# Patient Record
Sex: MALE | Race: WHITE
[De-identification: names, ages, dates, MRNs, and addresses within clinical notes are randomized per-mention and may not be internally consistent; named-entity substitution may affect disease eponyms.]

---

## 2018-07-30 ENCOUNTER — HOSPITAL ENCOUNTER (INPATIENT)
Dept: HOSPITAL 45 - C.EDB | Age: 73
LOS: 3 days | Discharge: HOME | DRG: 392 | End: 2018-08-02
Attending: HOSPITALIST | Admitting: HOSPITALIST
Payer: OTHER GOVERNMENT

## 2018-07-30 VITALS
SYSTOLIC BLOOD PRESSURE: 137 MMHG | TEMPERATURE: 99.14 F | OXYGEN SATURATION: 95 % | DIASTOLIC BLOOD PRESSURE: 69 MMHG | HEART RATE: 101 BPM

## 2018-07-30 VITALS
BODY MASS INDEX: 17.81 KG/M2 | BODY MASS INDEX: 17.81 KG/M2 | BODY MASS INDEX: 17.81 KG/M2 | HEIGHT: 71 IN | WEIGHT: 127.21 LBS | WEIGHT: 127.21 LBS | HEIGHT: 71 IN

## 2018-07-30 VITALS
TEMPERATURE: 97.52 F | DIASTOLIC BLOOD PRESSURE: 65 MMHG | OXYGEN SATURATION: 94 % | HEART RATE: 90 BPM | SYSTOLIC BLOOD PRESSURE: 164 MMHG

## 2018-07-30 VITALS
DIASTOLIC BLOOD PRESSURE: 83 MMHG | TEMPERATURE: 97.7 F | HEART RATE: 97 BPM | OXYGEN SATURATION: 99 % | SYSTOLIC BLOOD PRESSURE: 174 MMHG

## 2018-07-30 DIAGNOSIS — M16.11: ICD-10-CM

## 2018-07-30 DIAGNOSIS — Z87.891: ICD-10-CM

## 2018-07-30 DIAGNOSIS — E22.2: ICD-10-CM

## 2018-07-30 DIAGNOSIS — I10: ICD-10-CM

## 2018-07-30 DIAGNOSIS — F10.10: ICD-10-CM

## 2018-07-30 DIAGNOSIS — R63.4: ICD-10-CM

## 2018-07-30 DIAGNOSIS — K22.2: Primary | ICD-10-CM

## 2018-07-30 DIAGNOSIS — F17.200: ICD-10-CM

## 2018-07-30 DIAGNOSIS — T18.108A: ICD-10-CM

## 2018-07-30 LAB
ALBUMIN SERPL-MCNC: 3.6 GM/DL (ref 3.4–5)
ALP SERPL-CCNC: 102 U/L (ref 45–117)
ALT SERPL-CCNC: 38 U/L (ref 12–78)
AST SERPL-CCNC: 41 U/L (ref 15–37)
BASOPHILS # BLD: 0.02 K/UL (ref 0–0.2)
BASOPHILS NFR BLD: 0.2 %
BUN SERPL-MCNC: 9 MG/DL (ref 7–18)
CALCIUM SERPL-MCNC: 8.5 MG/DL (ref 8.5–10.1)
CALCIUM SERPL-MCNC: 8.6 MG/DL (ref 8.5–10.1)
CALCIUM SERPL-MCNC: 9.6 MG/DL (ref 8.5–10.1)
CO2 SERPL-SCNC: 26 MMOL/L (ref 21–32)
CO2 SERPL-SCNC: 28 MMOL/L (ref 21–32)
CO2 SERPL-SCNC: 29 MMOL/L (ref 21–32)
CREAT SERPL-MCNC: 0.62 MG/DL (ref 0.6–1.4)
CREAT SERPL-MCNC: 0.67 MG/DL (ref 0.6–1.4)
CREAT SERPL-MCNC: 0.67 MG/DL (ref 0.6–1.4)
EOS ABS #: 0.01 K/UL (ref 0–0.5)
EOSINOPHIL NFR BLD AUTO: 243 K/UL (ref 130–400)
GLUCOSE SERPL-MCNC: 157 MG/DL (ref 70–99)
GLUCOSE SERPL-MCNC: 89 MG/DL (ref 70–99)
GLUCOSE SERPL-MCNC: 97 MG/DL (ref 70–99)
HCT VFR BLD CALC: 39.4 % (ref 42–52)
HGB BLD-MCNC: 14.4 G/DL (ref 14–18)
IG#: 0.1 K/UL (ref 0–0.02)
IMM GRANULOCYTES NFR BLD AUTO: 5.2 %
INR PPP: 1 (ref 0.9–1.1)
LIPASE: 109 U/L (ref 73–393)
LYMPHOCYTES # BLD: 0.65 K/UL (ref 1.2–3.4)
MCH RBC QN AUTO: 33.6 PG (ref 25–34)
MCHC RBC AUTO-ENTMCNC: 36.5 G/DL (ref 32–36)
MCV RBC AUTO: 92.1 FL (ref 80–100)
MONO ABS #: 1.4 K/UL (ref 0.11–0.59)
MONOCYTES NFR BLD: 11.2 %
NEUT ABS #: 10.27 K/UL (ref 1.4–6.5)
NEUTROPHILS # BLD AUTO: 0.1 %
NEUTROPHILS NFR BLD AUTO: 82.5 %
PMV BLD AUTO: 8.4 FL (ref 7.4–10.4)
POTASSIUM SERPL-SCNC: 3.5 MMOL/L (ref 3.5–5.1)
POTASSIUM SERPL-SCNC: 4.3 MMOL/L (ref 3.5–5.1)
POTASSIUM SERPL-SCNC: 4.5 MMOL/L (ref 3.5–5.1)
PROT SERPL-MCNC: 7.6 GM/DL (ref 6.4–8.2)
RED CELL DISTRIBUTION WIDTH CV: 12.3 % (ref 11.5–14.5)
RED CELL DISTRIBUTION WIDTH SD: 41.6 FL (ref 36.4–46.3)
SODIUM RANDOM URINE: 26 MEQ/L
SODIUM SERPL-SCNC: 124 MMOL/L (ref 136–145)
SODIUM SERPL-SCNC: 125 MMOL/L (ref 136–145)
SODIUM SERPL-SCNC: 126 MMOL/L (ref 136–145)
T4 FREE SERPL-MCNC: 427 MOMS/KG (ref 500–800)
WBC # BLD AUTO: 12.45 K/UL (ref 4.8–10.8)

## 2018-07-30 RX ADMIN — GABAPENTIN SCH MG: 600 TABLET, FILM COATED ORAL at 21:09

## 2018-07-30 RX ADMIN — SODIUM CHLORIDE SCH MLS/HR: 900 INJECTION, SOLUTION INTRAVENOUS at 16:45

## 2018-07-30 RX ADMIN — ENOXAPARIN SODIUM SCH MG: 40 INJECTION SUBCUTANEOUS at 21:12

## 2018-07-30 RX ADMIN — Medication SCH TAB: at 17:16

## 2018-07-30 NOTE — EMERGENCY ROOM VISIT NOTE
History


Report prepared by Vicente:  Pauly Braun


Under the Supervision of:  Dr. Wyatt Tinajero D.O.


First contact with patient:  12:57


Chief Complaint:  SORETHROAT


Stated Complaint:  CANNOT SWALLOW





History of Present Illness


The patient is a 72 year old male who presents to the Emergency Room with 

complaints of a constant sore throat since last night. The patient notes that 

he was eating roast beef when it started to feel like something was lodged in 

his throat. He states that he tried to make himself vomit, but it did not 

relieve the feeling. The patient notes that he can swallow his secretions, but 

can not swallow water, other drinks, or food since last night. He states that 

when he does try to eat or drink, he vomits. The patient states that the same 

issue happened three years ago, where he was also eating roast beef. He notes 

that he had a surgery to remove the food. The patient also complains of middle 

back pain, which does not change in severity with twisting or turning. He 

denies chest pain, shortness of breath, weakness, and numbness.  Family also 

notes that he has been very weak recently.  He also admits to drinking about 6-

10 beers per night.





   Source of History:  patient


   Onset:  Last Night


   Position:  throat


   Quality:  other (sore)


   Timing:  constant


   Modifying Factors (Worsening):  eating, drinking


   Associated Symptoms:  + back pain, No chest pain, No SOB, No weakness, No 

numbness





Review of Systems


See HPI for pertinent positives & negatives. A total of 10 systems reviewed and 

were otherwise negative.





Past Medical & Surgical


Medical Problems:


(1) Arthritis


(2) Hypertension








Family History





Patient reports no known family medical history.





Social History


Smoking Status:  Never Smoker


Alcohol Use:  occasionally


Marital Status:  


Housing Status:  lives with significant other


Occupation Status:  employed





Current/Historical Medications


Scheduled


Lisinopril (Prinivil), 5 MG PO DAILY





Allergies


Coded Allergies:  


     No Known Allergies (Unverified , 7/30/18)





Physical Exam


Vital Signs











  Date Time  Temp Pulse Resp B/P (MAP) Pulse Ox O2 Delivery O2 Flow Rate FiO2


 


7/30/18 15:27  89 16 162/88 97 Room Air  


 


7/30/18 14:15  81 18 168/78 97 Room Air  


 


7/30/18 12:50 36.5 66 18 170/87 95 Room Air  











Physical Exam


GENERAL: Cachectic, malnourished, sitting up in bed, disheveled


EYE EXAM: normal conjunctiva. 


OROPHARYNX: no exudate, no erythema, lips, buccal mucosa, and tongue normal and 

mucous membranes are moist


NECK: supple, no nuchal rigidity, no adenopathy, non-tender


LUNGS: Clear to auscultation. Normal chest wall mechanics


HEART: no murmurs, S1 normal and S2 normal 


ABDOMEN: abdomen soft, non-tender, normo-active bowel sounds, no masses, no 

rebound or guarding. 


BACK: Back is symmetrical on inspection and there is no deformity, no midline 

tenderness, no CVA tenderness. 


SKIN: no rashes and no bruising 


UPPER EXTREMITIES: upper extremities are grossly normal. 


LOWER EXTREMITIES: No pitting edema. Calves are equal bilaterally.


NEURO EXAM: Normal sensorium, cranial nerves II-XII grossly intact, normal 

speech,  no gross weakness of arms, no gross weakness of legs.





Medical Decision & Procedures


ER Provider


Diagnostic Interpretation:


Radiology results as stated below per my review and the radiologist's 

interpretation: 





CHEST ONE VIEW PORTABLE





CLINICAL HISTORY: food stuck  dysphagia





COMPARISON STUDY:  6/16/2016





FINDINGS: Chronic emphysematous and basilar interstitial change. Chronic apical


pleural thickening primarily on the right.





No new or interval finding. No focal infiltrate. 





IMPRESSION:  Chronic emphysematous and interstitial change. No acute process. 

















The above report was generated using voice recognition software.  It may contain


grammatical, syntax or spelling errors.














Electronically signed by:  Salvador Johnson M.D.


7/30/2018 1:32 PM





Dictated Date/Time:  7/30/2018 1:31 PM





Laboratory Results











Test


  7/30/18


13:15 7/30/18


13:51


 


Magnesium Level


  2.0 mg/dl


(1.8-2.4) 


 


 


Total Bilirubin


  0.8 mg/dl


(0.2-1) 


 


 


Direct Bilirubin


  0.3 mg/dl


(0-0.2) 


 


 


Aspartate Amino Transf


(AST/SGOT) 41 U/L (15-37) 


  


 


 


Alanine Aminotransferase


(ALT/SGPT) 38 U/L (12-78) 


  


 


 


Alkaline Phosphatase


  102 U/L


() 


 


 


Total Protein


  7.6 gm/dl


(6.4-8.2) 


 


 


Albumin


  3.6 gm/dl


(3.4-5.0) 


 


 


Lipase


  109 U/L


() 


 


 


Immature Granulocyte % (Auto)  0.8 % 


 


White Blood Count


  


  12.45 K/uL


(4.8-10.8)


 


Red Blood Count


  


  4.28 M/uL


(4.7-6.1)


 


Hemoglobin


  


  14.4 g/dL


(14.0-18.0)


 


Hematocrit  39.4 % (42-52) 


 


Mean Corpuscular Volume


  


  92.1 fL


()


 


Mean Corpuscular Hemoglobin


  


  33.6 pg


(25-34)


 


Mean Corpuscular Hemoglobin


Concent 


  36.5 g/dl


(32-36)


 


Platelet Count


  


  243 K/uL


(130-400)


 


Mean Platelet Volume


  


  8.4 fL


(7.4-10.4)


 


Neutrophils (%) (Auto)  82.5 % 


 


Lymphocytes (%) (Auto)  5.2 % 


 


Monocytes (%) (Auto)  11.2 % 


 


Eosinophils (%) (Auto)  0.1 % 


 


Basophils (%) (Auto)  0.2 % 


 


Neutrophils # (Auto)


  


  10.27 K/uL


(1.4-6.5)


 


Lymphocytes # (Auto)


  


  0.65 K/uL


(1.2-3.4)


 


Monocytes # (Auto)


  


  1.40 K/uL


(0.11-0.59)


 


Eosinophils # (Auto)


  


  0.01 K/uL


(0-0.5)


 


Basophils # (Auto)


  


  0.02 K/uL


(0-0.2)


 


Immature Granulocyte # (Auto)


  


  0.10 K/uL


(0.00-0.02)





Laboratory results per my review.





Medications Administered











 Medications


  (Trade)  Dose


 Ordered  Sig/Jai


 Route  Start Time


 Stop Time Status Last Admin


Dose Admin


 


 Glucagon


  (Glucagon Inj)  1 mg  NOW  STAT


 IV  7/30/18 13:10


 7/30/18 13:12 DC 7/30/18 13:49


1 MG


 


 Ondansetron HCl


  (Zofran Inj)  4 mg  NOW  STAT


 IV  7/30/18 13:10


 7/30/18 13:12 DC 7/30/18 13:48


4 MG


 


 Sodium Chloride  500 ml @ 


 999 mls/hr  Q31M STAT


 IV  7/30/18 13:10


 7/30/18 13:40 DC 7/30/18 13:48


999 MLS/HR


 


 Sodium Chloride  500 ml @ 


 999 mls/hr  Q31M STAT


 IV  7/30/18 15:13


 7/30/18 15:43 DC 7/30/18 15:28


999 MLS/HR











ECG Per My Interpretation


Indication:  back/shoulder pain


Rate (beats per minute):  81


Rhythm:  sinus rhythm


Findings:  other (normal axis, no PVCs)





ED Course


ED COURSE: 


Vital signs were reviewed and showed situational hypertension.


The patients medical record was reviewed


The above diagnostic studies were performed and reviewed.


ED treatments and interventions as stated above. 





1302: The patient was evaluated in room A2. A complete history and physical 

examination was performed.





1310: Ordered  ml @ 999 mls/hr IV, Zofran Inj 4 mg IV, Glucagon 1 mg IV.





1447: I reevaluated the patient at this time. He was able to tolerate a glass 

of water.





1507: Upon reevaluation, the patient is resting comfortably. I discussed my 

findings with the patient and he understands and agrees with the treatment 

plan.   


Based on the patients age, coexisting illnesses, exam and lab findings the 

decision to treat as an inpatient was made.


The patient remained stable while under my care.


The patient will be evaluated for further management.





1510: I discussed the patient's case with Anette Spangler [facility] 

Hospitalist. She will evaluate the patient for further management.





1513: Ordered  ml @ 999 mls/hr IV.





Medical Decision


Differential Diagnosis includes but is not limited to dehydration, stroke, 

anemia, hypoglycemia, hyponatremia, hypernatremia, urinary tract infection, 

pneumonia, bronchitis, sepsis, gastroenteritis, additional abdominal pathology, 

metabolic abnormalities and infections.   





Patient is a 9-year-old male who presents the ER for inability to swallow.  He 

notes that last night he was eating beef and he felt something is stuck.  Since 

then he is only been able to tolerate secretions.  Onset this he notes has been 

feeling very weak.  He was given IV along with glucagon.  He had complete 

resolution of his symptoms of the food bolus.  He has had that before in the 

past.  CBC and BMP shows sodium of 124.  Bilirubin, LFTs and lipase was normal.

  Chest x-ray was unremarkable.  Patient/family were updated at bedside.  

Patient was given fluids.  Patient was admitted to internal medicine for 

further workup.





Medication Reconcilliation


Current Medication List:  was personally reviewed by me





Blood Pressure Screening


Patient's blood pressure:  Elevated blood pressure


Blood pressure disposition:  Elevated BP felt to be situational





Consults


Time Called:  1505


Consulting Physician:  Karla Schreckengost, Pa-C- [facility]


Returned Call:  1510


I discussed the patient's case with Karla Schreckengost, Pa-C- [facility] 

Hospitalist. She will evaluate the patient for further management.





Impression





 Primary Impression:  


 Esophageal foreign body


 Additional Impressions:  


 Hyponatremia


 Weak





Scribe Attestation


The scribe's documentation has been prepared under my direction and personally 

reviewed by me in its entirety. I confirm that the note above accurately 

reflects all work, treatment, procedures, and medical decision making performed 

by me.





Departure Information


Dispostion


Being Evaluated By Hospitalist





Referrals


Suyapa Shell C.R.N.P. (PCP)





Patient Instructions


[facility]





Problem Qualifiers








 Primary Impression:  


 Esophageal foreign body


 Encounter type:  initial encounter  Qualified Codes:  T18.108A - Unspecified 

foreign body in esophagus causing other injury, initial encounter

## 2018-07-30 NOTE — DIAGNOSTIC IMAGING REPORT
ABDOMEN AND PELVIS CT WITH IV AND ORAL CONTRAST



CT DOSE: 256.09 mGy.cm



HISTORY:      unintentional weight loss



TECHNIQUE: Multiaxial CT images of the abdomen and pelvis were performed

following the use of intravenous and oral contrast.  A dose lowering technique

was utilized adhering to the principles of ALARA.



COMPARISON STUDY: None.



FINDINGS: Emphysema at the lung bases. Scattered small patchy groundglass

densities within the lower lobes, right greater than left. This favors a mild

pneumonitis. No pneumoperitoneum. No pneumatosis. Severe degenerative changes

within the right hip. Bilateral L5 spondylolysis. There is a right hip effusion.

There is hepatic steatosis. No hepatic or splenic masses. The adrenal glands,

pancreas, gallbladder, and kidneys are unremarkable. No hydronephrosis. There is

a left retroaortic renal vein. A few calcified periportal lymph nodes. Calcified

plaque within the normal caliber abdominal aorta. Mild bladder wall thickening.

Colonic diverticulosis. No bowel wall thickening or obstruction. Normal

appendix. Mild motion artifact.



IMPRESSION: 



1. No bowel wall thickening or obstruction.

2. Normal appendix.

3. Colonic diverticulosis.

4. Hepatic steatosis.

5. Mild bladder wall thickening. This may be chronic. Recommend correlation with

urinalysis.

6. Severe right hip osteoarthritis with associated hip effusion.

7. Small patchy groundglass densities within the bilateral lower lobes. This may

represent a low-grade pneumonia.







Electronically signed by:  Cr Sherman M.D.

7/30/2018 7:25 PM



Dictated Date/Time:  7/30/2018 7:16 PM

## 2018-07-30 NOTE — HISTORY AND PHYSICAL
History & Physical


Date & Time of Service:


Jul 30, 2018 ~ 15:30


Chief Complaint:


Cannot Swallow


Primary Care Physician:


[facility]


History of Present Illness


Source:  patient


72-year-old male who presents the ED with reports of difficulty swallowing.  

Patient reports he had roast beef for dinner last night.  He reports that he 

had taken a bite and some got stuck in his throat.  He reports he was able to 

take small sips of water however anything larger he would have to spit up.  He 

reports he was able to swallow his own secretions.  He tried to self induce 

vomiting however that did not resolve his symptoms.  Symptoms persisted through 

to today and patient presented to the ER for further evaluation.  He denies 

chest pain and shortness of breath.  No lightheadedness, dizziness, diaphoresis

, syncopal events.  He denies abdominal pain, nausea, vomiting, diarrhea.  

Patient reports that he has had a poor appetite recently and that he has had a 

significant amount of weight loss however he is unsure of the exact amount.  He 

also has been having generalized weakness and has had about 4 falls in the past 

6-8 weeks.  These fall seem to be mechanical.  Patient denies any unilateral 

weakness, numbness, tingling.  Patient drinks 6-10 cans of beer per day.  He 

denies any other recent illnesses, fevers, chills.  He denies any urinary 

symptoms.  In the ED, patient was given IV glucagon and had improvement in his 

symptoms.  Labs show hyponatremia with sodium of 124.  Patient is 

hemodynamically stable.





Past Medical/Surgical History


Medical Problems:


(1) Arthritis


Status: Chronic  





(2) Hypertension


Status: Chronic  











Family History





FH: diabetes mellitus


  FATHER


FH: liver cancer


  FATHER


FH: prostate cancer


  BROTHER


FHx: sudden death of unknown cause


  BROTHER (62)





Social History


Smoking Status:  Former Smoker


Alcohol Use:  6-12 cans of beer/day


Marital Status:  


Housing status:  lives with family


Occupational Status:  employed





Allergies


Coded Allergies:  


     No Known Allergies (Unverified , 7/30/18)





Home Medications


Scheduled


Lisinopril (Prinivil), 5 MG PO DAILY





Review of Systems


ROS per HPI, all other systems reviewed and negative





Physical Exam


Vital Signs











  Date Time  Temp Pulse Resp B/P (MAP) Pulse Ox O2 Delivery O2 Flow Rate FiO2


 


7/30/18 16:35  94 16 158/92 96 Room Air  


 


7/30/18 15:27  89 16 162/88 97 Room Air  


 


7/30/18 14:15  81 18 168/78 97 Room Air  


 


7/30/18 12:50 36.5 66 18 170/87 95 Room Air  








General Appearance:  WD/WN, no apparent distress


Head:  normocephalic, atraumatic


Eyes:  normal inspection, EOMI, sclerae normal


ENT:  hearing grossly normal, + pertinent finding (Mucous membranes moist)


Neck:  supple, no JVD, trachea midline


Respiratory/Chest:  lungs clear, normal breath sounds, no respiratory distress


Cardiovascular:  regular rate, rhythm, no edema, normal peripheral pulses


Abdomen/GI:  normal bowel sounds, non tender, soft, no organomegaly


Extremities/Musculoskelatal:  normal inspection, no calf tenderness, normal 

capillary refill


Neurologic/Psych:  no motor/sensory deficits, alert, normal mood/affect, 

oriented x 3


Skin:  normal color, warm/dry





Diagnostics


Laboratory Results





Results Past 24 Hours








Test


  7/30/18


13:15 7/30/18


13:51 7/30/18


16:32 Range/Units


 


 


White Blood Count  12.45  4.8-10.8  K/uL


 


Red Blood Count  4.28  4.7-6.1  M/uL


 


Hemoglobin  14.4  14.0-18.0  g/dL


 


Hematocrit  39.4  42-52  %


 


Mean Corpuscular Volume  92.1    fL


 


Mean Corpuscular Hemoglobin  33.6  25-34  pg


 


Mean Corpuscular Hemoglobin


Concent 


  36.5


  


  32-36  g/dl


 


 


Platelet Count  243  130-400  K/uL


 


Mean Platelet Volume  8.4  7.4-10.4  fL


 


Neutrophils (%) (Auto)  82.5   %


 


Lymphocytes (%) (Auto)  5.2   %


 


Monocytes (%) (Auto)  11.2   %


 


Eosinophils (%) (Auto)  0.1   %


 


Basophils (%) (Auto)  0.2   %


 


Neutrophils # (Auto)  10.27  1.4-6.5  K/uL


 


Lymphocytes # (Auto)  0.65  1.2-3.4  K/uL


 


Monocytes # (Auto)  1.40  0.11-0.59  K/uL


 


Eosinophils # (Auto)  0.01  0-0.5  K/uL


 


Basophils # (Auto)  0.02  0-0.2  K/uL


 


RDW Standard Deviation  41.6  36.4-46.3  fL


 


RDW Coefficient of Variation  12.3  11.5-14.5  %


 


Immature Granulocyte % (Auto)  0.8   %


 


Immature Granulocyte # (Auto)  0.10  0.00-0.02  K/uL


 


Sodium Level  124  136-145  mmol/L


 


Potassium Level  4.5  3.5-5.1  mmol/L


 


Chloride Level  87    mmol/L


 


Carbon Dioxide Level  29  21-32  mmol/L


 


Anion Gap  8.0  3-11  mmol/L


 


Blood Urea Nitrogen  9  7-18  mg/dl


 


Creatinine


  


  0.67


  


  0.60-1.40


mg/dl


 


Est Creatinine Clear Calc


Drug Dose 


  82.9


  


   ml/min


 


 


Estimated GFR (


American) 


  112.8


  


   


 


 


Estimated GFR (Non-


American 


  97.4


  


   


 


 


BUN/Creatinine Ratio  13.6  10-20  


 


Random Glucose  97  70-99  mg/dl


 


Calcium Level  9.6  8.5-10.1  mg/dl











Diagnostic Radiology


CXR IMPRESSION:  Chronic emphysematous and interstitial change. No acute 

process.





Impression


Assessment and Plan


HYPONATREMIA


-Admit to telemetry


-Patient presenting from home with a food bolus, was given IV glucagon in the 

ED and had resolution of symptoms however incidentally found to be hyponatremic 

with sodium of 124


-Patient with history of alcohol abuse and also recent poor appetite which are 

both likely contributing patient's hyponatremia


-S/P 1 liter NSS in ED; will start NSS at 100 cc an hour and follow sodium 

levels closely


-Check urine and serum osmolality and urine sodium





FOOD BOLUS


-Given IV glucagon ED with resolution of symptoms


-History of similar event in 6/2018, underwent EGD with retrieval of food bolus

, EGD also demonstrated hiatal hernia and esophagitis -patient was to have 

follow-up EGD in 8 weeks however that was not completed


-Consult GI for EGD follow-up





ALCOHOL ABUSE


-Patient drinks 6-10 cans of beer/day


-Patient denies history of withdrawal symptoms in the past


-Will start alcohol withdrawal protocol with gabapentin, multivitamin, thiamine

, folic acid





WEIGHT LOSS


-Patient reporting poor appetite and is significant amount of weight loss for 

the past couple of months


-CT ABD/pelvis


-GI consult





HYPERTENSION


-BP is somewhat elevated however improving without intervention


-Continue lisinopril





DVT PROPHYLAXIS


-SQ Lovenox





DISPOSITION


-In my clinical judgment this beneficiary meets acute admission criteria, 

established by CMS, that includes being hospitalized through two midnights.








ATTENDING ADDENDUM





This is a 72-year-old male [facility]


Presented with choking sensation after eating dinner last night


Lab work showed hyponatremia with sodium of 124


Patient admits of drinking heavily


Also on intentional weight loss in past few months secondary to poor appetite








Hyponatremia:


Possibly secondary to alcohol abuse/worsened with poor p.o. intake dehydration


IV fluids with normal saline


Follow PRP closely


Nephrology consult requested








Choking with food :


Prior episode of similar event on 6/2018:


Underwent EGD : Showed hiatal hernia with esophagitis, food bolus removed


Patient did not follow-up for EGD in 8 weeks


Order for GI eval


Speech evaluation to assess dysphagia








Alcohol abuse


Reports of drinking 610 cans beer every day


Last drink was yesterday


Monitoring telemetry, with alcohol withdrawal protocol








Weight loss


Reports of poor appetite


CT abdomen pelvis does not show any pathology


Possible secondary to chronic alcohol abuse


Patient does not recall of having screening colonoscopy


GI consulted








Please refer to further documentation by Naheed SANTORO for discussion of 

other chronic issues





Brigitte Boateng MD





Resuscitation Status








VTE Prophylaxis


Will order VTE Prophylaxis:  Yes

## 2018-07-30 NOTE — DIAGNOSTIC IMAGING REPORT
CHEST ONE VIEW PORTABLE



CLINICAL HISTORY: food stuck  dysphagia



COMPARISON STUDY:  6/16/2016



FINDINGS: Chronic emphysematous and basilar interstitial change. Chronic apical

pleural thickening primarily on the right.



No new or interval finding. No focal infiltrate. 



IMPRESSION:  Chronic emphysematous and interstitial change. No acute process. 











The above report was generated using voice recognition software.  It may contain

grammatical, syntax or spelling errors.









Electronically signed by:  Salvador Johnson M.D.

7/30/2018 1:32 PM



Dictated Date/Time:  7/30/2018 1:31 PM

## 2018-07-31 VITALS
TEMPERATURE: 97.16 F | HEART RATE: 84 BPM | SYSTOLIC BLOOD PRESSURE: 168 MMHG | DIASTOLIC BLOOD PRESSURE: 81 MMHG | OXYGEN SATURATION: 97 %

## 2018-07-31 VITALS
TEMPERATURE: 99.32 F | SYSTOLIC BLOOD PRESSURE: 150 MMHG | HEART RATE: 93 BPM | OXYGEN SATURATION: 93 % | DIASTOLIC BLOOD PRESSURE: 74 MMHG

## 2018-07-31 VITALS
OXYGEN SATURATION: 91 % | DIASTOLIC BLOOD PRESSURE: 68 MMHG | HEART RATE: 78 BPM | SYSTOLIC BLOOD PRESSURE: 162 MMHG | TEMPERATURE: 98.6 F

## 2018-07-31 VITALS
OXYGEN SATURATION: 92 % | HEART RATE: 80 BPM | SYSTOLIC BLOOD PRESSURE: 144 MMHG | DIASTOLIC BLOOD PRESSURE: 73 MMHG | TEMPERATURE: 98.42 F

## 2018-07-31 VITALS
SYSTOLIC BLOOD PRESSURE: 161 MMHG | DIASTOLIC BLOOD PRESSURE: 75 MMHG | OXYGEN SATURATION: 92 % | TEMPERATURE: 98.78 F | HEART RATE: 86 BPM

## 2018-07-31 LAB
BUN SERPL-MCNC: 4 MG/DL (ref 7–18)
BUN SERPL-MCNC: 7 MG/DL (ref 7–18)
BUN SERPL-MCNC: 7 MG/DL (ref 7–18)
CALCIUM SERPL-MCNC: 8.1 MG/DL (ref 8.5–10.1)
CALCIUM SERPL-MCNC: 8.6 MG/DL (ref 8.5–10.1)
CALCIUM SERPL-MCNC: 8.8 MG/DL (ref 8.5–10.1)
CO2 SERPL-SCNC: 27 MMOL/L (ref 21–32)
CO2 SERPL-SCNC: 28 MMOL/L (ref 21–32)
CO2 SERPL-SCNC: 33 MMOL/L (ref 21–32)
CREAT SERPL-MCNC: 0.43 MG/DL (ref 0.6–1.4)
CREAT SERPL-MCNC: 0.48 MG/DL (ref 0.6–1.4)
CREAT SERPL-MCNC: 0.52 MG/DL (ref 0.6–1.4)
EOSINOPHIL NFR BLD AUTO: 198 K/UL (ref 130–400)
GLUCOSE SERPL-MCNC: 86 MG/DL (ref 70–99)
GLUCOSE SERPL-MCNC: 90 MG/DL (ref 70–99)
GLUCOSE SERPL-MCNC: 91 MG/DL (ref 70–99)
HCT VFR BLD CALC: 38 % (ref 42–52)
HGB BLD-MCNC: 13.4 G/DL (ref 14–18)
MCH RBC QN AUTO: 32.5 PG (ref 25–34)
MCHC RBC AUTO-ENTMCNC: 35.3 G/DL (ref 32–36)
MCV RBC AUTO: 92.2 FL (ref 80–100)
PMV BLD AUTO: 8.3 FL (ref 7.4–10.4)
POTASSIUM SERPL-SCNC: 3.7 MMOL/L (ref 3.5–5.1)
POTASSIUM SERPL-SCNC: 3.9 MMOL/L (ref 3.5–5.1)
POTASSIUM SERPL-SCNC: 4 MMOL/L (ref 3.5–5.1)
RED CELL DISTRIBUTION WIDTH CV: 12.3 % (ref 11.5–14.5)
RED CELL DISTRIBUTION WIDTH SD: 41.8 FL (ref 36.4–46.3)
SODIUM RANDOM URINE: 105 MEQ/L
SODIUM SERPL-SCNC: 125 MMOL/L (ref 136–145)
SODIUM SERPL-SCNC: 126 MMOL/L (ref 136–145)
SODIUM SERPL-SCNC: 126 MMOL/L (ref 136–145)
T4 FREE SERPL-MCNC: 311 MOMS/KG (ref 500–800)
WBC # BLD AUTO: 13.12 K/UL (ref 4.8–10.8)

## 2018-07-31 RX ADMIN — SODIUM CHLORIDE SCH MLS/HR: 900 INJECTION, SOLUTION INTRAVENOUS at 06:18

## 2018-07-31 RX ADMIN — GABAPENTIN SCH MG: 600 TABLET, FILM COATED ORAL at 21:42

## 2018-07-31 RX ADMIN — LISINOPRIL SCH MG: 5 TABLET ORAL at 08:04

## 2018-07-31 RX ADMIN — GABAPENTIN SCH MG: 600 TABLET, FILM COATED ORAL at 08:03

## 2018-07-31 RX ADMIN — GUAIFENESIN AND DEXTROMETHORPHAN PRN ML: 100; 10 SYRUP ORAL at 08:03

## 2018-07-31 RX ADMIN — GABAPENTIN SCH MG: 600 TABLET, FILM COATED ORAL at 03:47

## 2018-07-31 RX ADMIN — SODIUM CHLORIDE TAB 1 GM SCH GM: 1 TAB at 21:45

## 2018-07-31 RX ADMIN — SODIUM CHLORIDE SCH MLS/HR: 900 INJECTION, SOLUTION INTRAVENOUS at 17:32

## 2018-07-31 RX ADMIN — THIAMINE HYDROCHLORIDE SCH MLS/MIN: 100 INJECTION, SOLUTION INTRAMUSCULAR; INTRAVENOUS at 08:03

## 2018-07-31 RX ADMIN — THIAMINE HYDROCHLORIDE SCH MLS/MIN: 100 INJECTION, SOLUTION INTRAMUSCULAR; INTRAVENOUS at 08:25

## 2018-07-31 RX ADMIN — Medication SCH TAB: at 08:04

## 2018-07-31 RX ADMIN — GUAIFENESIN AND DEXTROMETHORPHAN PRN ML: 100; 10 SYRUP ORAL at 21:43

## 2018-07-31 RX ADMIN — PANTOPRAZOLE SCH MG: 40 TABLET, DELAYED RELEASE ORAL at 21:43

## 2018-07-31 RX ADMIN — ENOXAPARIN SODIUM SCH MG: 40 INJECTION SUBCUTANEOUS at 21:43

## 2018-07-31 NOTE — NEPHROLOGY CONSULTATION
DATE OF CONSULTATION:  07/31/2018

 

REASON FOR CONSULT:  Hyponatremia.

 

HISTORY OF PRESENT ILLNESS:  Patient is a 72-year-old male who presented to

the Emergency Department yesterday with reports of dysphagia.  Apparently, he

had a roast beef for dinner last night and he felt the food got stuck in his

food pipe.  He was not having choking or respiratory symptoms at the time. 

Symptoms persisted, so he presented to the Emergency Department.  Serum

sodium was noted to be low at 124.  Patient and his wife do not think that he

has had a history of low Sodium in the past. [facility]

panel of blood work at least once a year and was never told he had low

sodium.  He drinks about 10 beers every single day.  For the last few weeks,

he has been having multiple falls without syncope.  He has had slightly poor 
appetite

and about 10 pound weight loss in the last few weeks.  Since admission, he

has received normal saline--now about 24 hrs but it does not seem to have 
changed the serum

sodium at all.  The last 5 serum sodium reading has been in a pretty tight

range of 124-126.  He is not in fluid restriction as of now.

 

PAST MEDICAL/SURGICAL HISTORY:  Arthritis, hypertension, and alcohol abuse.

 

FAMILY HISTORY:  Positive for diabetes, liver cancer, prostate cancer, sudden

cardiac death.

 

SOCIAL HISTORY:  Former smoker.  Alcohol ongoing about 10 beers every single

day.  , lives with family.  Employed.

 

ALLERGIES:  No known drug allergies.

 

REVIEW OF SYSTEMS:  As detailed in the HPI, unless stated otherwise.  12

systems reviewed and negative.

 

MEDICATIONS:  Home medications include just lisinopril 5 mg daily.

 

PHYSICAL EXAMINATION:

VITAL SIGNS:  Blood pressure 162/60, 91% on room air, pulse rate 78 per

minute, temperature 37 degrees Celsius.

GENERAL:  Elderly white male who is not in any distress.  He is awake, alert,

oriented x3.

HEENT:  Mucous membrane is moist.

NECK:  Supple.  No jugular venous distention.

CHEST:  Bilateral prolonged expiration.  Occasional wheezing.

CARDIOVASCULAR SYSTEM:  S1 and S2 distant.  No edema.  Normal pulses.

GASTROINTESTINAL:  Abdomen is soft and nontender.

EXTREMITIES:  No edema.

NEUROLOGIC:  Awake, alert, oriented x3.  Normal speech.  No motor or sensory

deficit.

SKIN:  Normal color, warm, dry.

 

LABORATORY DATA:  Serum sodium 124 at the time of admission, and in about

24-hour time period, it has gone up by 2 and the most recent one is 126. 

Potassium 4.0, BUN 4, creatinine 0.4, serum osmolality 260, urine osmolality

427, urine sodium 26.

 

IMAGING:  Abdominal and pelvis CT scan was reviewed in detail.  Chest x-ray

does not show congestive heart failure, but it does show some chronic

emphysematous and interstitial changes suggestive of COPD.

 

ASSESSMENT AND PLAN:  A 72-year-old male whom I have been consulted for

evaluation and treatment of hyponatremia.

 

Hyponatremia: Serum osm is low so this is true hYponatremia. he appears 
euvolemic .

 Urine osmolality is definitely high suggestive of SIADH, but

then, the urine sodium was initially low, which goes more in favor of true 
Sodium deficit. 

.  In any case, he has received IV fluid for almost a day now with no change in 
serum Sodium. 

At this time, his blood pressure is running high, so I do not think he is

volume depleted anymore.  The etiology of hyponatremia is probably

multifactorial.  New onset nausea and pain can cause SIADH, especially in

susceptible patients with COPD.  On top of that, he drinks a lot of beer, as

much as 10 beers every single day. he most likely does not eat enough solid 
food so creating mismtach with liquid and solute load.  This is very high 
amount both from fluid

standpoint as well as alcohol standpoint in a 72-year-old male.  I did

discuss this with the patient as well as his wife that he really has to cut

down or stop drinking so much beer.  Because the urine osmolality is high, I

would give him Lasix 40 mg IV x1 dose now to help with the free water

diuresis.  Will also place him on a fluid restriction of 1800 mL/day. 

serum sodium of 126 is unlikely to cause any type of symptoms.  We will do

serial blood work.  The next blood work will be done around 7 o'clock, and we

will continue to adjust the medication based on the serum sodium readings. 



Serum Sodium still 126--stable. 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 
is not a new problem. Chronic Hyponatremia.

need to evaluate for Some malignancy given his heavy alcohol and Smoking 
history . Recommend CT -Chest as Small cell Cancer is the number 1 cancer 
causing SIADH.



 

Thank you very much.

 

 

 

[facility]

## 2018-07-31 NOTE — GASTROINTESTINAL CONSULTATION
Gastrointestinal Consultation


Date of Consultation:  Jul 31, 2018


Attending Physician:  Dr Steven Rasmussen


Consulting Physician:  Dr Chintan Harmon


Reason for Consultation:  dysphagia, wt loss


History of Present Illness


CC food stuck  HPI Wife with patient for H and P.  Pt seen by me when he came 

to ER 6/2016 for food bolus impaction. EGD at that time food in esophagus 

removed, grade B esophagitis, and erythema of antrum, 2 cm HH and duodenal bulb 

erosions. Repeat EGD recommended in 8 weeks but not done.  Pt has has not had 

problems getting food from mouth into esophagus but has has had solid bolus 

slowing since then. However, yesterday is the first time since 2016 food stuck 

to the point he needed to go to ER. He was eating roast beef and could handle 

saliva but only could drink small sips.  He was given glucagon and symptoms 

resolved but admitted secondary to low Na.  He drinks 6-10 beers daily. He 

states has lost 9 lbs of wt in 2 weeks. States not eating well but denies 

nausea or abd pain. No change in bowels. No bloody nor black stools. CXR on 

admit COPD and chronic interstitial changes.  CT a/p COPD, DJD, colon 

diverticulosis, fatty liver. Hgb on admit normal with slight drop post IVF.  

Some weakness also recently with falling.





Past Medical/Surgical History


Medical Problems:


(1) Esophageal foreign body


Status: Acute  





(2) Hyponatremia


Status: Acute  





(3) Weak


Status: Acute  











Family History





FH: diabetes mellitus


  FATHER


FH: liver cancer


  FATHER


FH: prostate cancer


  BROTHER


FHx: sudden death of unknown cause


  BROTHER (62)





Social History


Smoking Status:  Former Smoker


Alcohol Use:  heavy


Marital Status:  


Housing Status:  lives with significant other


Occupation Status:  employed





Allergies


Coded Allergies:  


     No Known Allergies (Unverified , 7/30/18)





Current Medications





Home Meds and Scripts








 Medications  Dose


 Route/Sig


 Max Daily Dose Days Date Category


 


 Prinivil


  (Lisinopril) 5 Mg


 Tab  5 Mg


 PO DAILY


    7/30/18 Reported











Review of Systems


See HPI otherwise 10 ROS neg





Physical Exam











  Date Time  Temp Pulse Resp B/P (MAP) Pulse Ox O2 Delivery O2 Flow Rate FiO2


 


7/31/18 11:08 37.0 78 18 162/68 (99) 91 Room Air  


 


7/31/18 08:00      Room Air  


 


7/31/18 07:37 37.4 93 18 150/74 (99) 93 Room Air  


 


7/31/18 04:00 37.1 86 19 161/75 (103) 92 Room Air  


 


7/31/18 00:00      Room Air  


 


7/30/18 23:27 37.3 101 16 137/69 (91) 95   


 


7/30/18 20:31 36.4 90 18 164/65 (98) 94 Room Air  


 


7/30/18 20:00      Room Air  


 


7/30/18 17:38 36.5 97 20 174/83 99 Room Air  


 


7/30/18 16:35  94 16 158/92 96 Room Air  


 


7/30/18 15:27  89 16 162/88 97 Room Air  


 


7/30/18 14:15  81 18 168/78 97 Room Air  








General Appearance:  WD/WN, no apparent distress


Eyes:  normal inspection, PERRL


ENT:  normal ENT inspection, hearing grossly normal


Neck:  supple, trachea midline


Respiratory/Chest:  lungs clear, no respiratory distress


Cardiovascular:  no edema, no murmur


Abdomen:  normal bowel sounds, non tender, soft, no organomegaly


Extremities:  non-tender


Neurologic/Psych:  CNs II-XII nml as tested, normal mood/affect, oriented x 3


Skin:  normal color, warm/dry





Laboratory Results





Last 24 Hours








Test


  7/30/18


16:32 7/30/18


17:29 7/30/18


21:05 7/31/18


02:03


 


Prothrombin Time 11.0 SECONDS    


 


Prothromb Time International


Ratio 1.0 


  


  


  


 


 


Sodium Level 126 mmol/L   125 mmol/L  125 mmol/L 


 


Potassium Level 4.3 mmol/L   3.5 mmol/L  3.9 mmol/L 


 


Chloride Level 92 mmol/L   90 mmol/L  92 mmol/L 


 


Carbon Dioxide Level 26 mmol/L   28 mmol/L  27 mmol/L 


 


Anion Gap 8.0 mmol/L   6.0 mmol/L  6.0 mmol/L 


 


Blood Urea Nitrogen 9 mg/dl   9 mg/dl  7 mg/dl 


 


Creatinine 0.62 mg/dl   0.67 mg/dl  0.48 mg/dl 


 


Est Creatinine Clear Calc


Drug Dose 89.5 ml/min 


  


  82.9 ml/min 


  115.7 ml/min 


 


 


Estimated GFR (


American) 116.5 


  


  112.8 


  129.4 


 


 


Estimated GFR (Non-


American 100.5 


  


  97.4 


  111.7 


 


 


BUN/Creatinine Ratio 14.1   12.7  14.3 


 


Random Glucose 89 mg/dl   157 mg/dl  91 mg/dl 


 


Osmolality 263 mOsm/kg    


 


Calcium Level 8.5 mg/dl   8.6 mg/dl  8.1 mg/dl 


 


Ethyl Alcohol mg/dL < 3.0 mg/dl    


 


Urine Osmolality  427 mOms/kg   


 


Urine Random Sodium  26 mEq/L   


 


White Blood Count    13.12 K/uL 


 


Red Blood Count    4.12 M/uL 


 


Hemoglobin    13.4 g/dL 


 


Hematocrit    38.0 % 


 


Mean Corpuscular Volume    92.2 fL 


 


Mean Corpuscular Hemoglobin    32.5 pg 


 


Mean Corpuscular Hemoglobin


Concent 


  


  


  35.3 g/dl 


 


 


RDW Standard Deviation    41.8 fL 


 


RDW Coefficient of Variation    12.3 % 


 


Platelet Count    198 K/uL 


 


Mean Platelet Volume    8.3 fL 


 


Test


  7/31/18


13:58 


  


  


 











Impression





esophageal dysphagia--recommend EGD with prn dilatation to start instead of 

videofluoro study and barium esophagram so will schedule that with DR Smith 

tomorrow. Proc and risks explained which include but not limited to med reaction

, bleeding, perforation, aspiration, and missed lesions.








wt loss--CT neg. See what EGD shows but may need outpt colonoscopy





normocytic anemia--mild--check Fe sat, B12, folate, and ferritin





hx of esophagitis---PPI








ETOH abuse--recommend he stop








Hyponatremia--per primary








I am going off service tomorrow 8/1 at 0800 and Dr Smith is assuming GI care 

then.

## 2018-07-31 NOTE — PROGRESS NOTE
Internal Med Progress Note


Date of Service:


Jul 31, 2018.


Provider Documentation:





SUBJECTIVE:





Seen and examined at bedside


Feels well


Denies chest pain, SOB, dizziness, abd pain, odynophagia


Family at bedside


No complaints


 





OBJECTIVE:





Vital Signs-as noted below





Physical Exam:


General Appearance:Thin, no apparent distress


Head:  normocephalic, Atraumatic 


Eyes:  normal inspection, EOMI, PERRL


Neck:  supple, Trachea midline


Respiratory/Chest: Normal breath sounds, CTA


Cardiovascular: S1, S2, No murmur


Abdomen/GI:Soft, Non tender, Bowel sounds present


Extremities/Musculoskelatal:normal inspection, no edema 


Neurologic/Psych:AAOX3, grossly no focal neurological deficits 


Skin:  normal color, warm





Lab data as noted below.


ASSESSMENT & PLAN:





Chronic Hyponatremia


Likely secondary to alcohol abuse and partly dehydration, DD:SIADH


No confusion issues per family


Continue IV fluids for now


Check TSH, Free T4


Nephrology consulted











Dysphagia


Food Bolus


S/P IV glucagon ED with resolution of symptoms


History of similar event in 6/2016, EGD at the [facility] showed hiatal hernia and 

esophagitis


Needs swallow eval


GI  consulted


Reports history of weight loss. May need colonoscopy as outpatient











Alcohol Abuse


Patient drinks 6-10 cans of beer a day


continue alcohol withdrawal protocol with gabapentin


continue multivitamin, thiamine, folic acid








HTN:


Mildly elevated


Asymptomatic


Continue lisinopril








Right hip osteoarthritis 


H/O falls


Follow up with Orthopedics as outpatient


PT/OT








DVT Px


SQ Lovenox








Disposition:


PT/OT


Vital Signs:











  Date Time  Temp Pulse Resp B/P (MAP) Pulse Ox O2 Delivery O2 Flow Rate FiO2


 


7/31/18 11:08 37.0 78 18 162/68 (99) 91 Room Air  


 


7/31/18 08:00      Room Air  


 


7/31/18 07:37 37.4 93 18 150/74 (99) 93 Room Air  


 


7/31/18 04:00 37.1 86 19 161/75 (103) 92 Room Air  


 


7/31/18 00:00      Room Air  


 


7/30/18 23:27 37.3 101 16 137/69 (91) 95   


 


7/30/18 20:31 36.4 90 18 164/65 (98) 94 Room Air  


 


7/30/18 20:00      Room Air  


 


7/30/18 17:38 36.5 97 20 174/83 99 Room Air  


 


7/30/18 16:35  94 16 158/92 96 Room Air  


 


7/30/18 15:27  89 16 162/88 97 Room Air  


 


7/30/18 14:15  81 18 168/78 97 Room Air  








Lab Results:





Results Past 24 Hours








Test


  7/30/18


13:15 7/30/18


13:51 7/30/18


16:32 7/30/18


17:29 Range/Units


 


 


Magnesium Level 2.0    1.8-2.4  mg/dl


 


Total Bilirubin 0.8    0.2-1  mg/dl


 


Direct Bilirubin 0.3    0-0.2  mg/dl


 


Aspartate Amino Transf


(AST/SGOT) 41


  


  


  


  15-37  U/L


 


 


Alanine Aminotransferase


(ALT/SGPT) 38


  


  


  


  12-78  U/L


 


 


Alkaline Phosphatase 102      U/L


 


Total Protein 7.6    6.4-8.2  gm/dl


 


Albumin 3.6    3.4-5.0  gm/dl


 


Lipase 109      U/L


 


White Blood Count  12.45   4.8-10.8  K/uL


 


Red Blood Count  4.28   4.7-6.1  M/uL


 


Hemoglobin  14.4   14.0-18.0  g/dL


 


Hematocrit  39.4   42-52  %


 


Mean Corpuscular Volume  92.1     fL


 


Mean Corpuscular Hemoglobin  33.6   25-34  pg


 


Mean Corpuscular Hemoglobin


Concent 


  36.5


  


  


  32-36  g/dl


 


 


Platelet Count  243   130-400  K/uL


 


Mean Platelet Volume  8.4   7.4-10.4  fL


 


Neutrophils (%) (Auto)  82.5    %


 


Lymphocytes (%) (Auto)  5.2    %


 


Monocytes (%) (Auto)  11.2    %


 


Eosinophils (%) (Auto)  0.1    %


 


Basophils (%) (Auto)  0.2    %


 


Neutrophils # (Auto)  10.27   1.4-6.5  K/uL


 


Lymphocytes # (Auto)  0.65   1.2-3.4  K/uL


 


Monocytes # (Auto)  1.40   0.11-0.59  K/uL


 


Eosinophils # (Auto)  0.01   0-0.5  K/uL


 


Basophils # (Auto)  0.02   0-0.2  K/uL


 


RDW Standard Deviation  41.6   36.4-46.3  fL


 


RDW Coefficient of Variation  12.3   11.5-14.5  %


 


Immature Granulocyte % (Auto)  0.8    %


 


Immature Granulocyte # (Auto)  0.10   0.00-0.02  K/uL


 


Sodium Level  124 126  136-145  mmol/L


 


Potassium Level  4.5 4.3  3.5-5.1  mmol/L


 


Chloride Level  87 92    mmol/L


 


Carbon Dioxide Level  29 26  21-32  mmol/L


 


Anion Gap  8.0 8.0  3-11  mmol/L


 


Blood Urea Nitrogen  9 9  7-18  mg/dl


 


Creatinine


  


  0.67


  0.62


  


  0.60-1.40


mg/dl


 


Est Creatinine Clear Calc


Drug Dose 


  82.9


  89.5


  


   ml/min


 


 


Estimated GFR (


American) 


  112.8


  116.5


  


   


 


 


Estimated GFR (Non-


American 


  97.4


  100.5


  


   


 


 


BUN/Creatinine Ratio  13.6 14.1  10-20  


 


Random Glucose  97 89  70-99  mg/dl


 


Calcium Level  9.6 8.5  8.5-10.1  mg/dl


 


Prothrombin Time


  


  


  11.0


  


  9.0-12.0


SECONDS


 


Prothromb Time International


Ratio 


  


  1.0


  


  0.9-1.1  


 


 


Osmolality


  


  


  263


  


  280-300


mOsm/kg


 


Ethyl Alcohol mg/dL   < 3.0  0-3  mg/dl


 


Urine Osmolality


  


  


  


  427


  500-800


mOms/kg


 


Urine Random Sodium    26  mEq/L


 


Test


  7/30/18


21:05 7/31/18


02:03 


  


  Range/Units


 


 


Sodium Level 125 125   136-145  mmol/L


 


Potassium Level 3.5 3.9   3.5-5.1  mmol/L


 


Chloride Level 90 92     mmol/L


 


Carbon Dioxide Level 28 27   21-32  mmol/L


 


Anion Gap 6.0 6.0   3-11  mmol/L


 


Blood Urea Nitrogen 9 7   7-18  mg/dl


 


Creatinine


  0.67


  0.48


  


  


  0.60-1.40


mg/dl


 


Est Creatinine Clear Calc


Drug Dose 82.9


  115.7


  


  


   ml/min


 


 


Estimated GFR (


American) 112.8


  129.4


  


  


   


 


 


Estimated GFR (Non-


American 97.4


  111.7


  


  


   


 


 


BUN/Creatinine Ratio 12.7 14.3   10-20  


 


Random Glucose 157 91   70-99  mg/dl


 


Calcium Level 8.6 8.1   8.5-10.1  mg/dl


 


White Blood Count  13.12   4.8-10.8  K/uL


 


Red Blood Count  4.12   4.7-6.1  M/uL


 


Hemoglobin  13.4   14.0-18.0  g/dL


 


Hematocrit  38.0   42-52  %


 


Mean Corpuscular Volume  92.2     fL


 


Mean Corpuscular Hemoglobin  32.5   25-34  pg


 


Mean Corpuscular Hemoglobin


Concent 


  35.3


  


  


  32-36  g/dl


 


 


RDW Standard Deviation  41.8   36.4-46.3  fL


 


RDW Coefficient of Variation  12.3   11.5-14.5  %


 


Platelet Count  198   130-400  K/uL


 


Mean Platelet Volume  8.3   7.4-10.4  fL

## 2018-08-01 VITALS
OXYGEN SATURATION: 93 % | SYSTOLIC BLOOD PRESSURE: 161 MMHG | HEART RATE: 79 BPM | DIASTOLIC BLOOD PRESSURE: 84 MMHG | TEMPERATURE: 98.06 F

## 2018-08-01 VITALS
TEMPERATURE: 98.24 F | DIASTOLIC BLOOD PRESSURE: 71 MMHG | SYSTOLIC BLOOD PRESSURE: 151 MMHG | HEART RATE: 86 BPM | OXYGEN SATURATION: 91 %

## 2018-08-01 VITALS
TEMPERATURE: 98.24 F | DIASTOLIC BLOOD PRESSURE: 71 MMHG | SYSTOLIC BLOOD PRESSURE: 151 MMHG | HEART RATE: 77 BPM | OXYGEN SATURATION: 93 %

## 2018-08-01 VITALS
OXYGEN SATURATION: 95 % | TEMPERATURE: 98.6 F | DIASTOLIC BLOOD PRESSURE: 79 MMHG | SYSTOLIC BLOOD PRESSURE: 147 MMHG | HEART RATE: 62 BPM

## 2018-08-01 VITALS — OXYGEN SATURATION: 92 %

## 2018-08-01 VITALS
TEMPERATURE: 98.78 F | DIASTOLIC BLOOD PRESSURE: 73 MMHG | SYSTOLIC BLOOD PRESSURE: 149 MMHG | OXYGEN SATURATION: 94 % | HEART RATE: 85 BPM

## 2018-08-01 VITALS
HEART RATE: 99 BPM | DIASTOLIC BLOOD PRESSURE: 83 MMHG | TEMPERATURE: 98.24 F | SYSTOLIC BLOOD PRESSURE: 165 MMHG | OXYGEN SATURATION: 95 %

## 2018-08-01 VITALS
SYSTOLIC BLOOD PRESSURE: 154 MMHG | TEMPERATURE: 98.78 F | DIASTOLIC BLOOD PRESSURE: 82 MMHG | OXYGEN SATURATION: 93 % | HEART RATE: 87 BPM

## 2018-08-01 VITALS — OXYGEN SATURATION: 94 %

## 2018-08-01 LAB
BUN SERPL-MCNC: 6 MG/DL (ref 7–18)
BUN SERPL-MCNC: 8 MG/DL (ref 7–18)
CALCIUM SERPL-MCNC: 8.5 MG/DL (ref 8.5–10.1)
CALCIUM SERPL-MCNC: 8.6 MG/DL (ref 8.5–10.1)
CO2 SERPL-SCNC: 30 MMOL/L (ref 21–32)
CO2 SERPL-SCNC: 31 MMOL/L (ref 21–32)
CREAT SERPL-MCNC: 0.43 MG/DL (ref 0.6–1.4)
CREAT SERPL-MCNC: 0.56 MG/DL (ref 0.6–1.4)
EOSINOPHIL NFR BLD AUTO: 189 K/UL (ref 130–400)
GLUCOSE SERPL-MCNC: 192 MG/DL (ref 70–99)
GLUCOSE SERPL-MCNC: 90 MG/DL (ref 70–99)
HCT VFR BLD CALC: 37.9 % (ref 42–52)
HGB BLD-MCNC: 13.4 G/DL (ref 14–18)
MCH RBC QN AUTO: 32.4 PG (ref 25–34)
MCHC RBC AUTO-ENTMCNC: 35.4 G/DL (ref 32–36)
MCV RBC AUTO: 91.8 FL (ref 80–100)
PMV BLD AUTO: 8.1 FL (ref 7.4–10.4)
POTASSIUM SERPL-SCNC: 3 MMOL/L (ref 3.5–5.1)
POTASSIUM SERPL-SCNC: 3.7 MMOL/L (ref 3.5–5.1)
RED CELL DISTRIBUTION WIDTH CV: 12.1 % (ref 11.5–14.5)
RED CELL DISTRIBUTION WIDTH SD: 41.1 FL (ref 36.4–46.3)
SODIUM SERPL-SCNC: 126 MMOL/L (ref 136–145)
SODIUM SERPL-SCNC: 127 MMOL/L (ref 136–145)
TRANSFERRIN SERPL-MCNC: 125 MG/DL (ref 200–360)
WBC # BLD AUTO: 11.29 K/UL (ref 4.8–10.8)

## 2018-08-01 PROCEDURE — 0DB58ZX EXCISION OF ESOPHAGUS, VIA NATURAL OR ARTIFICIAL OPENING ENDOSCOPIC, DIAGNOSTIC: ICD-10-PCS | Performed by: SPECIALIST

## 2018-08-01 RX ADMIN — ACETAMINOPHEN PRN MG: 325 TABLET ORAL at 09:00

## 2018-08-01 RX ADMIN — PANTOPRAZOLE SCH MG: 40 TABLET, DELAYED RELEASE ORAL at 20:37

## 2018-08-01 RX ADMIN — THIAMINE HYDROCHLORIDE SCH MLS/MIN: 100 INJECTION, SOLUTION INTRAMUSCULAR; INTRAVENOUS at 07:57

## 2018-08-01 RX ADMIN — ENOXAPARIN SODIUM SCH MG: 40 INJECTION SUBCUTANEOUS at 20:38

## 2018-08-01 RX ADMIN — Medication SCH TAB: at 08:55

## 2018-08-01 RX ADMIN — GABAPENTIN SCH MG: 600 TABLET, FILM COATED ORAL at 18:08

## 2018-08-01 RX ADMIN — SODIUM CHLORIDE TAB 1 GM SCH GM: 1 TAB at 06:00

## 2018-08-01 RX ADMIN — PANTOPRAZOLE SCH MG: 40 TABLET, DELAYED RELEASE ORAL at 08:55

## 2018-08-01 RX ADMIN — GABAPENTIN SCH MG: 600 TABLET, FILM COATED ORAL at 06:00

## 2018-08-01 RX ADMIN — SODIUM CHLORIDE TAB 1 GM SCH GM: 1 TAB at 13:58

## 2018-08-01 RX ADMIN — LISINOPRIL SCH MG: 5 TABLET ORAL at 08:56

## 2018-08-01 RX ADMIN — ACETAMINOPHEN PRN MG: 325 TABLET ORAL at 19:34

## 2018-08-01 NOTE — GI REPORT
Patient Name: Ananth Mendoza

Procedure Date: 8/1/2018 3:03 PM

MRN: [id number]

Account Number: [id number]

YOB: 1945

Admit Type: Inpatient

Age: 72

Gender: Male

Attending MD: Juvencio Smith MD

Procedure:            Upper GI endoscopy

Providers:            Juvencio Smith MD

Referring MD:         Steven Rasmussen Md

Indications:          Dysphagia

Medicines:            Propofol per Anesthesia

Complications:        No immediate complications. Estimated blood loss: 

                      Minimal.

Estimated Blood Loss: Estimated blood loss was minimal.

Procedure:            Pre-Anesthesia Assessment:

                      - Prior to the procedure, a History and Physical was 

                      performed, and patient medications and allergies were 

                      reviewed. The patient's tolerance of previous 

                      anesthesia was also reviewed. The risks and benefits of 

                      the procedure and the sedation options and risks were 

                      discussed with the patient. All questions were 

                      answered, and informed consent was obtained. Prior 

                      Anticoagulants: The patient has taken no previous 

                      anticoagulant or antiplatelet agents. ASA Grade 

                      Assessment: III - A patient with severe systemic 

                      disease. After reviewing the risks and benefits, the 

                      patient was deemed in satisfactory condition to undergo 

                      the procedure.

                      After obtaining informed consent, the endoscope was 

                      passed under direct vision. Throughout the procedure, 

                      the patient's blood pressure, pulse, and oxygen 

                      saturations were monitored continuously. The scope was 

                      introduced through the mouth, and advanced to the 

                      second part of duodenum. The upper GI endoscopy was 

                      accomplished without difficulty. The patient tolerated 

                      the procedure well.

Findings:

     The upper third of the esophagus and middle third of the esophagus were 

     normal.

     LA Grade C (one or more mucosal breaks continuous between tops of 2 or 

     more mucosal folds, less than 75% circumference) esophagitis with no 

     bleeding was found 41 to 42 cm from the incisors. Biopsies were taken 

     with a cold forceps for histology after dilation below was performed. 

     Verification of patient identification for the specimen was done by the 

     physician and technician using the patient's name and medical record 

     number.

     One benign-appearing, intrinsic stenosis was found 42 cm from the 

     incisors. This stenosis was mildly severe and measured 1.2 cm (inner 

     diameter) x less than one cm (in length). The stenosis was traversed. A 

     guidewire was placed and the scope was withdrawn. Dilation was performed 

     with a Savary dilator with no resistance at 45 Fr, 48 Fr and 51 Fr.

     The entire examined stomach was normal.

     The examined duodenum was normal.

     The cardia and gastric fundus were normal on retroflexion.

     Retained gastric contents are not identified on this exam.

Impression:           - Normal upper third of esophagus and middle third of 

                      esophagus.

                      - LA Grade C reflux esophagitis. Biopsied.

                      - Benign-appearing esophageal stenosis. Dilated.

                      - Normal stomach.

                      - Normal examined duodenum.

Recommendation:       - Resume regular diet.

                      - Return patient to [facility] for [facility] care.

                      - Advance diet as tolerated.

                      - Continue present medications.

                      - Use a proton pump inhibitor PO BID indefinitely.

                      - Repeat upper endoscopy for surveillance based on 

                      pathology results.

MD Juvencio Tovar MD

8/1/2018 3:45:27 PM

This report has been signed electronically.

Note Initiated On: 8/1/2018 3:03 PM

Number of Addenda: 0

     I attest to the content of the Intraoperative Record and orders 

     documented therein, exceptions below



{[id number]}

## 2018-08-01 NOTE — HISTORY & PHYSICAL BRIDGE NOTE
H&P Re-Evaluation


Bridge Note:


I have examined the patient, reviewed the History & Physical and in the 

interval since the performance of the History & Physical I have noted the 

following changes of clinical significance: No changes noted





EGD for dysphagia

## 2018-08-01 NOTE — PROGRESS NOTE
Internal Med Progress Note


Date of Service:


Aug 1, 2018.


Provider Documentation:





SUBJECTIVE:





Seen and examined at bedside


Planned for EGD today


No complaints


Denies chest pain, SOB, dizziness, abd pain, odynophagia


 





OBJECTIVE:





Vital Signs-as noted below





Physical Exam:


General Appearance:Thin, no apparent distress


Head:  normocephalic, Atraumatic 


Eyes:  normal inspection, EOMI, PERRL


Neck:  supple, Trachea midline


Respiratory/Chest: Normal breath sounds, CTA


Cardiovascular: S1, S2, No murmur


Abdomen/GI:Soft, Non tender, Bowel sounds present


Extremities/Musculoskelatal:normal inspection, no edema 


Neurologic/Psych:AAOX3, grossly no focal neurological deficits 


Skin:  normal color, warm





Lab data as noted below.


ASSESSMENT & PLAN:





Chronic Hyponatremia


Likely secondary to alcohol abuse, Poor oral intake, SIADH


No confusion issues per family


Received IV fluids  and IV Lasix---unchanged sodium levels 


Continue fluid restriction


Thyroid function near normal


Monitor sodium levels


Appreciate Nephrology Input


Consider CT chest to R/O cancer given H/O smoking and Possibly SIADH  











Esophageal Dysphagia


Food Bolus


S/P IV glucagon ED with resolution of symptoms


History of similar event in 6/2016, EGD at the [facility] showed hiatal hernia and 

esophagitis


Planned for EGD today


Appreciate GI Input


Reports history of weight loss. Needs colonoscopy as outpatient











Alcohol Abuse


Patient drinks 6-10 cans of beer a day


continue alcohol withdrawal protocol with gabapentin


continue multivitamin, thiamine, folic acid








HTN:


Mildly elevated


Asymptomatic


Continue lisinopril








Hypomagnesemia:


Replace Mag


monitor








Right hip osteoarthritis 


H/O falls


Follow up with Orthopedics as outpatient


PT/OT








DVT Px


SQ Lovenox








Disposition:


PT/OT


Vital Signs:











  Date Time  Temp Pulse Resp B/P (MAP) Pulse Ox O2 Delivery O2 Flow Rate FiO2


 


8/1/18 16:10  83 20 171/77 (108) 95 Room Air  


 


8/1/18 16:00  89 20 157/88 (111) 94 Room Air  


 


8/1/18 15:53  94 20 146/78 (100) 95 Room Air  


 


8/1/18 15:43  93 20 158/78 (104) 98 Partial Rebreather 5 


 


8/1/18 14:26 36.9 75 20 144/80 (101) 95 Room Air  


 


8/1/18 11:31 36.8 77 18 151/71 (97) 93   


 


8/1/18 07:50     92 Room Air  


 


8/1/18 07:25 36.8 86 18 151/71 (97) 91   


 


8/1/18 04:00 37.1 87 18 154/82 (106) 93 Room Air  


 


8/1/18 00:24 37.1 85 18 149/73 (98) 94 Room Air  


 


8/1/18 00:00      Room Air  


 


7/31/18 20:30      Room Air  


 


7/31/18 20:16 36.9 80 18 144/73 (96) 92 Room Air  








Lab Results:





Results Past 24 Hours








Test


  7/31/18


19:03 8/1/18


06:05 Range/Units


 


 


Sodium Level 126 126 136-145  mmol/L


 


Potassium Level 3.7 3.7 3.5-5.1  mmol/L


 


Chloride Level 88 90   mmol/L


 


Carbon Dioxide Level 33 30 21-32  mmol/L


 


Anion Gap 4.0 6.0 3-11  mmol/L


 


Blood Urea Nitrogen 7 6 7-18  mg/dl


 


Creatinine


  0.52


  0.43


  0.60-1.40


mg/dl


 


Est Creatinine Clear Calc


Drug Dose 109.6


  132.5


   ml/min


 


 


Estimated GFR (


American) 125.2


  135.4


   


 


 


Estimated GFR (Non-


American 108.0


  116.8


   


 


 


BUN/Creatinine Ratio 12.4 12.9 10-20  


 


Random Glucose 90 90 70-99  mg/dl


 


Calcium Level 8.8 8.5 8.5-10.1  mg/dl


 


White Blood Count  11.29 4.8-10.8  K/uL


 


Red Blood Count  4.13 4.7-6.1  M/uL


 


Hemoglobin  13.4 14.0-18.0  g/dL


 


Hematocrit  37.9 42-52  %


 


Mean Corpuscular Volume  91.8   fL


 


Mean Corpuscular Hemoglobin  32.4 25-34  pg


 


Mean Corpuscular Hemoglobin


Concent 


  35.4


  32-36  g/dl


 


 


RDW Standard Deviation  41.1 36.4-46.3  fL


 


RDW Coefficient of Variation  12.1 11.5-14.5  %


 


Platelet Count  189 130-400  K/uL


 


Mean Platelet Volume  8.1 7.4-10.4  fL


 


Magnesium Level  1.7 1.8-2.4  mg/dl


 


Iron Level  18   mcg/dl


 


Transferrin  125 200-360  mg/dl


 


Transferrin % Saturation  10 20-50  %


 


Ferritin


  


  945.2


  8.0-388.0


ng/ml


 


Vitamin B12 Level  846 211-911  pg/mL


 


Folate  14.74 >5.38  ng/mL


 


Thyroid Stimulating Hormone


(TSH) 


  0.649


  0.300-4.500


uIu/ml


 


Free Thyroxine


  


  1.43


  0.80-1.60


ng/dl

## 2018-08-01 NOTE — ANESTHESIOLOGY PROGRESS NOTE
Anesthesia Post Op Note


Date & Time


Aug 1, 2018 at 15:36





Vital Signs


Pain Intensity:  6.0





Vital Signs Past 12 Hours








  Date Time  Temp Pulse Resp B/P (MAP) Pulse Ox O2 Delivery O2 Flow Rate FiO2


 


8/1/18 14:26 36.9 75 20 144/80 (101) 95 Room Air  


 


8/1/18 11:31 36.8 77 18 151/71 (97) 93   


 


8/1/18 07:50     92 Room Air  


 


8/1/18 07:25 36.8 86 18 151/71 (97) 91   


 


8/1/18 04:00 37.1 87 18 154/82 (106) 93 Room Air  











Notes


Mental Status:  alert / awake / arousable, participated in evaluation


Pt Amnestic to Procedure:  Yes


Nausea / Vomiting:  adequately controlled


Pain:  adequately controlled


Airway Patency, RR, SpO2:  stable & adequate


BP & HR:  stable & adequate


Hydration State:  stable & adequate


Anesthetic Complications:  no major complications apparent

## 2018-08-01 NOTE — GASTROENTEROLOGY PROGRESS NOTE
DATE: 08/01/2018

 

This is a gastroenterology inpatient update note.

 

SUBJECTIVE:  Patient underwent upper endoscopy today.  There was evidence for

a nonobstructing stenosis, presumably due to reflux disease with evidence of

esophagitis above this region.  This was dilated with 45, 48, 51-Kinyarwanda

dilators without resistance.  Biopsies were also taken of the region

esophagitis and narrowing.  Stomach and duodenum were normal.

 

IMPRESSION AND PLAN:  Patient with active esophagitis and would benefit from

prolonged proton pump inhibitor therapy.  Would recommend omeprazole or

equivalent 40 mg twice daily for at least several months and can be reduced

at some point.  The patient also will need an outpatient colonoscopy

eventually and this can be arranged through the GI office.  Patient should

have followup with Dr. Harmon in GI clinic at [facility] who has seen

the patient in the past.  All questions answered.

 

 

 

 

IVONNE

## 2018-08-01 NOTE — PROGRESS NOTE
DATE: 08/01/2018

 

SUBJECTIVE:  No new issues overnight.  Sodium seems to be stuck at the very

tight range of 126 for the last 3 readings.  Whether we give normal saline or

Lasix, it seems to be the same.  He is making urine and made 1400 mL.

 

OBJECTIVE:

GENERAL:  He is not in any distress.  He is currently n.p.o. and supposed to

have endoscopy later today.  He is walking in the hallways.

VITAL SIGNS:  Blood pressure is 151/71, 92% on room air, pulse rate 86 per

minute, temperature 36.8.

HEENT:  Mucous membranes moist.

NECK:  Supple.  No jugular venous distention.

CHEST:  Bilateral expiratory wheezing and decreased breath sounds.

CARDIOVASCULAR:  S1 and S2, regular.

ABDOMEN:  Soft, nontender.

EXTREMITIES:  Shows no edema.

 

LABORATORY TESTS:  From this morning show sodium 126, potassium 3.7, BUN 6,

creatinine 0.43, magnesium 1.7.  Iron 18, transferrin saturation 10%. 

Hemoglobin 13.4.  TSH 0.649, T4 is 1.43.

 

ASSESSMENT AND PLAN:  A 72-year-old male whom I have been consulted for

evaluation and treatment of hyponatremia.  Based on the fact that serum

sodium has remained absolutely stable at 126 for almost 36 hours whether we

give normal saline or some Lasix, it appears this is not a new problem.  This

is chronic problem and in fact he has had low sodium for the last 10 years. 



The etiology of the low sodium is secondary to very excessive intake of

liquid as he drinks about 10-12 beer every single day.  His salt and protein

intake is very low as evidenced by super low BUN.  He definitely has some

urinary dilution problem consistent with SIADH.  The combination of very

excessive liquid drinking with very low solid food intake combined with

urinary dilution problem is the cause of his chronic hyponatremia. 



 As long as his serum sodium is more than 125, he does not need to be in the 
hospital.

 We may not be able to get his serum sodium normal and we do not have to. 

Case was discussed in detail with the primary service.  The patient is

currently n.p.o.  While he is n.p.o., we will give him normal saline at 75 mL

per hour, but once he starts eating, then it can be stopped.  He needs to be

on fluid restriction 1800 mL per day.  Normally, the treatment of chronic

SIADH is salt tablet with Lasix.  For the time being,

continue salt tablet 1 g twice daily.

 

 

 

 

MTDD

## 2018-08-02 VITALS
DIASTOLIC BLOOD PRESSURE: 83 MMHG | SYSTOLIC BLOOD PRESSURE: 158 MMHG | OXYGEN SATURATION: 95 % | HEART RATE: 58 BPM | TEMPERATURE: 98.42 F

## 2018-08-02 VITALS
HEART RATE: 83 BPM | OXYGEN SATURATION: 95 % | TEMPERATURE: 97.88 F | SYSTOLIC BLOOD PRESSURE: 171 MMHG | DIASTOLIC BLOOD PRESSURE: 84 MMHG

## 2018-08-02 VITALS
SYSTOLIC BLOOD PRESSURE: 163 MMHG | HEART RATE: 90 BPM | TEMPERATURE: 98.06 F | OXYGEN SATURATION: 93 % | DIASTOLIC BLOOD PRESSURE: 81 MMHG

## 2018-08-02 VITALS
DIASTOLIC BLOOD PRESSURE: 84 MMHG | TEMPERATURE: 97.88 F | HEART RATE: 83 BPM | SYSTOLIC BLOOD PRESSURE: 171 MMHG | OXYGEN SATURATION: 95 %

## 2018-08-02 VITALS — OXYGEN SATURATION: 94 %

## 2018-08-02 LAB
BUN SERPL-MCNC: 6 MG/DL (ref 7–18)
CALCIUM SERPL-MCNC: 8.4 MG/DL (ref 8.5–10.1)
CO2 SERPL-SCNC: 29 MMOL/L (ref 21–32)
CREAT SERPL-MCNC: 0.37 MG/DL (ref 0.6–1.4)
EOSINOPHIL NFR BLD AUTO: 196 K/UL (ref 130–400)
GLUCOSE SERPL-MCNC: 92 MG/DL (ref 70–99)
HCT VFR BLD CALC: 36.1 % (ref 42–52)
HGB BLD-MCNC: 12.7 G/DL (ref 14–18)
MCH RBC QN AUTO: 32.4 PG (ref 25–34)
MCHC RBC AUTO-ENTMCNC: 35.2 G/DL (ref 32–36)
MCV RBC AUTO: 92.1 FL (ref 80–100)
PMV BLD AUTO: 8.4 FL (ref 7.4–10.4)
POTASSIUM SERPL-SCNC: 3.8 MMOL/L (ref 3.5–5.1)
RED CELL DISTRIBUTION WIDTH CV: 12.1 % (ref 11.5–14.5)
RED CELL DISTRIBUTION WIDTH SD: 40.9 FL (ref 36.4–46.3)
SODIUM SERPL-SCNC: 129 MMOL/L (ref 136–145)
WBC # BLD AUTO: 11.38 K/UL (ref 4.8–10.8)

## 2018-08-02 RX ADMIN — SODIUM CHLORIDE TAB 1 GM SCH GM: 1 TAB at 05:51

## 2018-08-02 RX ADMIN — LISINOPRIL SCH MG: 5 TABLET ORAL at 08:03

## 2018-08-02 RX ADMIN — PANTOPRAZOLE SCH MG: 40 TABLET, DELAYED RELEASE ORAL at 08:03

## 2018-08-02 RX ADMIN — GUAIFENESIN AND DEXTROMETHORPHAN PRN ML: 100; 10 SYRUP ORAL at 05:50

## 2018-08-02 RX ADMIN — THIAMINE HYDROCHLORIDE SCH MLS/MIN: 100 INJECTION, SOLUTION INTRAMUSCULAR; INTRAVENOUS at 08:03

## 2018-08-02 RX ADMIN — GUAIFENESIN AND DEXTROMETHORPHAN PRN ML: 100; 10 SYRUP ORAL at 08:02

## 2018-08-02 RX ADMIN — GABAPENTIN SCH MG: 600 TABLET, FILM COATED ORAL at 05:50

## 2018-08-02 RX ADMIN — Medication SCH TAB: at 08:03

## 2018-08-02 NOTE — NEPHROLOGY PROGRESS NOTE
Nephrology Progress Note


Date of Service:


Aug 2, 2018.


Subjective


Patient with Hypovolemic hyponatremia. Patient is seated comfortably in bed. no 

issues over night. sodium improved from previous baseline to 129. tolerating 

fluid restriction. reports that he was able to "eat a good meal" this morning 

but notes that he became full quickly. urine output ok. no SOB, confusion, 

chest pain, nausea.





Objective











  Date Time  Temp Pulse Resp B/P (MAP) Pulse Ox O2 Delivery O2 Flow Rate FiO2


 


8/2/18 07:25 36.7 90 18 163/81 (108) 93 Room Air  


 


8/2/18 04:28 36.9 58 18 158/83 (108) 95 Room Air  


 


8/2/18 00:08     94 Room Air  


 


8/1/18 23:48 37.0 62 18 147/79 (101) 95 Room Air  


 


8/1/18 19:39 36.8 99 18 165/83 (110) 95 Room Air  


 


8/1/18 16:51 36.7 79 18 161/84 (109) 93 Room Air  


 


8/1/18 16:10  83 20 171/77 (108) 95 Room Air  


 


8/1/18 16:00     94 Room Air  


 


8/1/18 16:00  89 20 157/88 (111) 94 Room Air  


 


8/1/18 15:53  94 20 146/78 (100) 95 Room Air  


 


8/1/18 15:43  93 20 158/78 (104) 98 Partial Rebreather 5 


 


8/1/18 14:26 36.9 75 20 144/80 (101) 95 Room Air  


 


8/1/18 11:31 36.8 77 18 151/71 (97) 93   








Physical Exam:


General: Alert, no distress, thin


Head: Normocephalic, No lesions


ENT: no scleral icterus, moist mucosal membranes


Neck: Supple, no adenopathy


Heart: Regular rate and rhythm, no murmurs, no gallops


Lungs: reduced air movement. left lower>right lower lung


Abdomen: Soft, Nontender, nondistended, +Bowel Sounds


Extremities: No joint deformities, effusion, or inflammation, no edema, no 

clubbing


Neuro Exam: Alert and oriented x 3 with fluent speech, no focal motor deficits, 

limited insight.





Current Inpatient Medications








 Medications


  (Trade)  Dose


 Ordered  Sig/Jai


 Route  Start Time


 Stop Time Status Last Admin


Dose Admin


 


 Enoxaparin Sodium


  (Lovenox Inj)  40 mg  QPM


 SC  7/30/18 21:00


 8/29/18 20:59  8/1/18 20:38


40 MG


 


 Acetaminophen


  (Tylenol Tab)  650 mg  Q4H  PRN


 PO  7/30/18 16:00


 8/29/18 15:59  8/1/18 19:34


650 MG


 


 Ondansetron HCl


  (Zofran Inj)  4 mg  Q6H  PRN


 IV  7/30/18 16:00


 8/29/18 15:59   


 


 


 Thiamine HCl 100


 mg/Syringe  10 ml @ 2


 mls/min  Q24H


 IV  7/31/18 09:00


 8/30/18 08:59  8/2/18 08:03


2 MLS/MIN


 


 Lorazepam


  (Ativan Inj)  1 mg  ONE  PRN


 IV  7/30/18 16:15


     


 


 


 Multivitamins


  (Multivitamin


 Tab)  1 tab  QAM


 PO  7/30/18 16:15


 8/29/18 16:14  8/2/18 08:03


1 TAB


 


 Folic Acid


  (Folvite Tab)  1 mg  QAM


 PO  7/31/18 09:00


 8/30/18 08:59  8/2/18 08:03


1 MG


 


 Lisinopril


  (Zestril Tab)  5 mg  DAILY


 PO  7/31/18 09:00


 8/30/18 08:59  8/2/18 08:03


5 MG


 


 Gabapentin


  (Neurontin Tab)  600 mg  Q24H


 PO  8/3/18 06:00


 8/3/18 06:01   


 


 


 Ioversol


  (Optiray 320)  111 ml  UD  PRN


 IV  7/30/18 17:45


 8/3/18 17:44   


 


 


 Guaifenesin/


 Dextromethorphan


  (Robitussin-Dm


 Syrup)  5 ml  Q6H  PRN


 PO  7/30/18 21:30


 8/29/18 21:29  8/2/18 08:02


5 ML


 


 Pantoprazole


 Sodium


  (Protonix Tab)  40 mg  BID


 PO  7/31/18 21:00


 8/30/18 20:59  8/2/18 08:03


40 MG


 


 Sodium Chloride


  (Sodium Chloride


 Tab)  1 gm  [id number]


 PO  7/31/18 21:00


 8/30/18 20:59  8/2/18 05:51


1 GM











Last 24 Hours








Test


  8/1/18


18:24 8/2/18


05:12


 


Sodium Level 127 mmol/L  129 mmol/L 


 


Potassium Level 3.0 mmol/L  3.8 mmol/L 


 


Chloride Level 91 mmol/L  93 mmol/L 


 


Carbon Dioxide Level 31 mmol/L  29 mmol/L 


 


Anion Gap 5.0 mmol/L  7.0 mmol/L 


 


Blood Urea Nitrogen 8 mg/dl  6 mg/dl 


 


Creatinine 0.56 mg/dl  0.37 mg/dl 


 


Est Creatinine Clear Calc


Drug Dose 100.0 ml/min 


  151.4 ml/min 


 


 


Estimated GFR (


American) 121.5 


  144.0 


 


 


Estimated GFR (Non-


American 104.8 


  124.3 


 


 


BUN/Creatinine Ratio 13.9  17.3 


 


Random Glucose 192 mg/dl  92 mg/dl 


 


Calcium Level 8.6 mg/dl  8.4 mg/dl 


 


White Blood Count  11.38 K/uL 


 


Red Blood Count  3.92 M/uL 


 


Hemoglobin  12.7 g/dL 


 


Hematocrit  36.1 % 


 


Mean Corpuscular Volume  92.1 fL 


 


Mean Corpuscular Hemoglobin  32.4 pg 


 


Mean Corpuscular Hemoglobin


Concent 


  35.2 g/dl 


 


 


RDW Standard Deviation  40.9 fL 


 


RDW Coefficient of Variation  12.1 % 


 


Platelet Count  196 K/uL 


 


Mean Platelet Volume  8.4 fL 


 


Magnesium Level  1.9 mg/dl 








Other Studies:


8/2/18 05:12








8/1/18 18:24








8/2/18 05:12

















Test


  8/1/18


18:24 8/2/18


05:12


 


Anion Gap


  5.0 mmol/L


(3-11) 7.0 mmol/L


(3-11)


 


Est Creatinine Clear Calc


Drug Dose 100.0 ml/min 


  151.4 ml/min 


 


 


Estimated GFR (


American) 121.5 


  144.0 


 


 


Estimated GFR (Non-


American 104.8 


  124.3 


 


 


BUN/Creatinine Ratio 13.9 (10-20)  17.3 (10-20) 


 


Calcium Level


  8.6 mg/dl


(8.5-10.1) 8.4 mg/dl


(8.5-10.1)


 


Red Blood Count


  


  3.92 M/uL


(4.7-6.1)


 


Mean Corpuscular Volume


  


  92.1 fL


()


 


Mean Corpuscular Hemoglobin


  


  32.4 pg


(25-34)


 


Mean Corpuscular Hemoglobin


Concent 


  35.2 g/dl


(32-36)


 


RDW Standard Deviation


  


  40.9 fL


(36.4-46.3)


 


RDW Coefficient of Variation


  


  12.1 %


(11.5-14.5)


 


Mean Platelet Volume


  


  8.4 fL


(7.4-10.4)


 


Magnesium Level


  


  1.9 mg/dl


(1.8-2.4)














 8/1/18 8/2/18 8/3/18





 08:00 08:00 08:00


 


Intake Total 789 ml 725 ml 


 


Output Total 950 ml  


 


Balance -161 ml 725 ml 











Assessment & Plan


Patient is a 72 year old male with hypovolemic hyponatremia. 





Hyponatremia: has had for at least ten years. likely attributed excessive 

liquid intake and low salt and protein intake with some urinary dilution 

problem consistent with SIADH. goal sodium for this patient is greater than 

125. on 1800mL fluid restriction. eating more per patient and sodium increased 

at 129. continue salt tab 1 gram twice daily. will hold on lasix for the time 

being. 





hypokalemia: corrected with supplementation. last check 3.8. will continue to 

follow. 





This patient was Discussed  with Dr. Del Rio. Thank you for the opportunity to 

participate in this patient's care. Appreciate the Consult.





ATTENDING NOTE: 


Agree with note above and plan. I have discussed the patient's management with 

Natalya Mata PA-C. Please refer to the above note for the documented findings 

and plan of care.





William Del Rio MD

## 2018-08-02 NOTE — DISCHARGE SUMMARY
Discharge Summary


Date of Service


Aug 2, 2018.





Discharge Summary


Admission Date:


Jul 30, 2018 at 15:57


Discharge Date:  Aug 2, 2018


Discharge Disposition:  Home


Principal Diagnosis:


Esophageal Stenosis, Hyponatremia, Alcohol use disorder


Procedures:


CT ABD:


1. No bowel wall thickening or obstruction.


2. Normal appendix.


3. Colonic diverticulosis.


4. Hepatic steatosis.


5. Mild bladder wall thickening. This may be chronic. Recommend correlation with


urinalysis.


6. Severe right hip osteoarthritis with associated hip effusion.


7. Small patchy groundglass densities within the bilateral lower lobes. This may


represent a low-grade pneumonia.





CXR:


Chronic emphysematous and interstitial change. No acute process. 








EGD:


Impression:           - Normal upper third of esophagus and middle third of 


                      esophagus.


                      - LA Grade C reflux esophagitis. Biopsied.


                      - Benign-appearing esophageal stenosis. Dilated.


                      - Normal stomach.


                      - Normal examined duodenum.


Recommendation:       - Resume regular diet.


                      - Return patient to hospital [facility] for ongoing care.


                      - Advance diet as tolerated.


                      - Continue present medications.


                      - Use a proton pump inhibitor PO BID indefinitely.


                      - Repeat upper endoscopy for surveillance based on 


                      pathology results.


Consultations:


Nephrology, GI


Pending Studies/Follow-Up:


Follow up with your [facility] Care physician at [facility] in 1 week


Follow up with  as outpatient- GI clinic at [facility]  for 

colonoscopy as outpatient








Restrict your oral fluid intake to 1800ml per day as suggested by your 

Nephrologist


Get Renal function test (Blood Test) in 1 week and follow up with your 

Physician to further asses your Sodium levels


Consider getting a CT chest as outpatient to screen/rule out cancer as 

outpatient 


Your Esophageal Pathology report is pending. Follow up with [facility] Physician 

regarding results


Quit drinking alcohol as advised


Seek immediate medical attention if your symptoms reoccur or worsen





Medication Reconciliation


New Medications:  


Thiamine Hcl (B-1) 100 Mg Tab


100 MG PO DAILY for 30 Days, #30 TABS





Folic Acid (Folic Acid) 1 Mg Tab


1 MG PO QAM for 30 Days, #30 TAB





Multiple Vitamin (Daily-Monika) 1 Tab Tab


1 TAB PO QAM for 30 Days, #30 TAB





Pantoprazole (Pantoprazole Sodium) 40 Mg Tab


40 MG PO BID for 30 Days, #60 TAB 2 Refills





 


Continued Medications:  


Lisinopril (Prinivil) 5 Mg Tab


5 MG PO DAILY, TAB











Admission Information


HPI (per Admitting provider):


72-year-old male who presents the ED with reports of difficulty swallowing.  

Patient reports he had roast beef for dinner last night.  He reports that he 

had taken a bite and some got stuck in his throat.  He reports he was able to 

take small sips of water however anything larger he would have to spit up.  He 

reports he was able to swallow his own secretions.  He tried to self induce 

vomiting however that did not resolve his symptoms.  Symptoms persisted through 

to today and patient presented to the ER for further evaluation.  He denies 

chest pain and shortness of breath.  No lightheadedness, dizziness, diaphoresis

, syncopal events.  He denies abdominal pain, nausea, vomiting, diarrhea.  

Patient reports that he has had a poor appetite recently and that he has had a 

significant amount of weight loss however he is unsure of the exact amount.  He 

also has been having generalized weakness and has had about 4 falls in the past 

6-8 weeks.  These fall seem to be mechanical.  Patient denies any unilateral 

weakness, numbness, tingling.  Patient drinks 6-10 cans of beer per day.  He 

denies any other recent illnesses, fevers, chills.  He denies any urinary 

symptoms.  In the ED, patient was given IV glucagon and had improvement in his 

symptoms.  Labs show hyponatremia with sodium of 124.  Patient is 

hemodynamically stable.


Physical Exam (per Admitting):  


   General Appearance:  WD/WN, no apparent distress


   Head:  normocephalic, atraumatic


   Eyes:  normal inspection, EOMI, sclerae normal


   ENT:  hearing grossly normal, + pertinent finding (Mucous membranes moist)


   Neck:  supple, no JVD, trachea midline


   Respiratory/Chest:  lungs clear, normal breath sounds, no respiratory 

distress


   Cardiovascular:  regular rate, rhythm, no edema, normal peripheral pulses


   Abdomen/GI:  normal bowel sounds, non tender, soft, no organomegaly


   Extremities/Musculoskelatal:  normal inspection, no calf tenderness, normal 

capillary refill


   Neurologic/Psych:  no motor/sensory deficits, alert, normal mood/affect, 

oriented x 3


   Skin:  normal color, warm/dry





Hospital Course





Chronic Hyponatremia


Likely secondary to alcohol abuse, Poor oral intake, SIADH


No confusion issues per family


Received IV fluids  and IV Lasix---unchanged sodium levels 


Continue fluid restriction::; Sodium levels improved


Thyroid function near normal


Monitor sodium levels


Appreciate Nephrology Input


Advised to get CT chest as outpatient to R/O cancer given H/O smoking and 

Possibly SIADH  











Esophageal Dysphagia S/P EGD-Dilatation of benign appearing esophageal stenosis 


Food Bolus


S/P IV glucagon ED with resolution of symptoms


History of similar event in 6/2016, EGD at the [facility] showed hiatal hernia and 

esophagitis


Continue Protonic BID for Esophagitis


Appreciate GI Input


Needs colonoscopy with  as outpatient- GI clinic at [facility] 


Pathology: pending











Alcohol Abuse


Patient drinks 6-10 cans of beer a day


continue alcohol withdrawal protocol with gabapentin


continue multivitamin, thiamine, folic acid


Patient not interested in Alcohol Rehab placement








HTN:


Mildly elevated


Asymptomatic


Also received Salt Tablets


Continue lisinopril








Hypomagnesemia:


Resolved 


monitor








Right hip osteoarthritis 


H/O falls


Follow up with Orthopedics as outpatient


PT/OT








DVT Px


SQ Lovenox








Disposition:


PT/OT: return Home





Plan to discharge home today








Follow up with your [facility] Care physician at [facility] in 1 week


Follow up with  as outpatient- GI clinic at [facility]  for 

colonoscopy as outpatient








Restrict your oral fluid intake to 1800ml per day as suggested by your 

Nephrologist


Get Renal function test (Blood Test) in 1 week and follow up with your 

Physician to further asses your Sodium levels


Consider getting a CT chest as outpatient to screen/rule out cancer as 

outpatient 


Your Esophageal Pathology report is pending. Follow up with [facility] Physician 

regarding results


Quit drinking alcohol as advised


Seek immediate medical attention if your symptoms reoccur or worsen


Total time spent on discharge = 40 minutes


This includes examination of the patient, discharge planning, medication 

reconciliation, and communication with other providers.





Discharge Instructions


Discharge Instructions


Date of Service


Aug 2, 2018.





Admission


Reason for Admission:  Hyponatremia





Discharge


Discharge Diagnosis / Problem:  Esophageal Stenosis, Hyponatremia, Alcohol use 

disorder





Discharge Goals


Goal(s):  Decrease discomfort, Improve function





Activity Recommendations


Activity Limitations:  resume your previous activity


Exercise/Sports Limitations:  as tolerated





.





Instructions / Follow-Up


Instructions / Follow-Up


Follow up with your Primary Care physician at [facility] in 1 week


Follow up with  as outpatient- GI clinic at [facility]  for 

colonoscopy as outpatient








Restrict your oral fluid intake to 1800ml per day as suggested by your 

Nephrologist


Get Renal function test (Blood Test) in 1 week and follow up with your 

Physician to further asses your Sodium levels


Consider getting a CT chest as outpatient to screen/rule out cancer as 

outpatient 


Your Esophageal Pathology report is pending. Follow up with [facility] Physician 

regarding results


Quit drinking alcohol as advised


Seek immediate medical attention if your symptoms reoccur or worsen





Current Hospital Diet


Patient's current hospital diet: Regular Diet





Discharge Diet


Recommended Diet:  Regular Diet


Fluid Restriction:  1800 ml (7 cups)





Pending Studies


Studies pending at discharge:  yes


List of pending studies:  


Esophageal Pathology report





Medical Emergencies








.


Who to Call and When:





Medical Emergencies:  If at any time you feel your situation is an emergency, 

please call 911 immediately.





.





Non-Emergent Contact


Non-Emergency issues call your:  Primary Care Provider, Gastroenterologist


Call Non-Emergent contact if:  you have a fever, your pain is not controlled, 

your pain is worsening, your pain is unusual for you, your pain is concerning 

you, you have any medication questions


Seek immediate medical attention if your symptoms reoccur or worsen


.


.








"Provider Documentation" section prepared by Steven Rasmussen.








.

## 2018-08-02 NOTE — DISCHARGE INSTRUCTIONS
Discharge Instructions


Date of Service


Aug 2, 2018.





Admission


Reason for Admission:  Hyponatremia





Discharge


Discharge Diagnosis / Problem:  Esophageal Stenosis, Hyponatremia, Alcohol use 

disorder





Discharge Goals


Goal(s):  Decrease discomfort, Improve function





Activity Recommendations


Activity Limitations:  resume your previous activity


Exercise/Sports Limitations:  as tolerated





.





Instructions / Follow-Up


Instructions / Follow-Up


Follow up with your [facility] at [facility] in 1 week


Follow up with  as outpatient- GI clinic at [facility]  for 

colonoscopy as outpatient








Restrict your oral fluid intake to 1800ml per day as suggested by your 

Nephrologist


Get Renal function test (Blood Test) in 1 week and follow up with your 

Physician to further asses your Sodium levels


Consider getting a CT chest as outpatient to screen/rule out cancer as 

outpatient 


Your Esophageal Pathology report is pending. Follow up with [facility] Physician 

regarding results


Quit drinking alcohol as advised


Seek immediate medical attention if your symptoms reoccur or worsen





Current Hospital Diet


Patient's current hospital diet: Regular Diet





Discharge Diet


Recommended Diet:  Regular Diet


Fluid Restriction:  1800 ml (7 cups)





Pending Studies


Studies pending at discharge:  yes


List of pending studies:  


Esophageal Pathology report





Medical Emergencies








.


Who to Call and When:





Medical Emergencies:  If at any time you feel your situation is an emergency, 

please call 911 immediately.





.





Non-Emergent Contact


Non-Emergency issues call your:  Primary Care Provider, Gastroenterologist


Call Non-Emergent contact if:  you have a fever, your pain is not controlled, 

your pain is worsening, your pain is unusual for you, your pain is concerning 

you, you have any medication questions


Seek immediate medical attention if your symptoms reoccur or worsen


.


.








"Provider Documentation" section prepared by Steven Rasumssen.








.

## 2018-08-02 NOTE — PROGRESS NOTE
Internal Med Progress Note


Date of Service:


Aug 2, 2018.


Provider Documentation:





SUBJECTIVE:





Seen and examined at bedside


Doing well today


Dysphagia resolved, eating lunch with no issues


Had EGD with dilatation of esophageal stenosis


Sodium levels better


Discussed with Nephrology: Will DC salt tablets 


No other complaints


Denies chest pain, SOB, dizziness, abd pain, odynophagia


Patient not interested in Alcohol rehab placement


No withdrawal symptoms 





OBJECTIVE:





Vital Signs-as noted below





Physical Exam:


General Appearance:Thin, no apparent distress


Head:  normocephalic, Atraumatic 


Eyes:  normal inspection, EOMI, PERRL


Neck:  supple, Trachea midline


Respiratory/Chest: Normal breath sounds, CTA


Cardiovascular: S1, S2, No murmur


Abdomen/GI:Soft, Non tender, Bowel sounds present


Extremities/Musculoskelatal:normal inspection, no edema 


Neurologic/Psych:AAOX3, grossly no focal neurological deficits 


Skin:  normal color, warm





Lab data as noted below.


ASSESSMENT & PLAN:





Chronic Hyponatremia


Likely secondary to alcohol abuse, Poor oral intake, SIADH


No confusion issues per family


Received IV fluids  and IV Lasix---unchanged sodium levels 


Continue fluid restriction::; Sodium levels improved


Thyroid function near normal


Monitor sodium levels


Appreciate Nephrology Input


Advised to get CT chest as outpatient to R/O cancer given H/O smoking and 

Possibly SIADH  











Esophageal Dysphagia S/P EGD-Dilatation of benign appearing esophageal stenosis 


Food Bolus


S/P IV glucagon ED with resolution of symptoms


History of similar event in 6/2016, EGD at [facility] showed hiatal hernia and 

esophagitis


Continue Protonic BID for Esophagitis


Appreciate GI Input


Needs colonoscopy with  as outpatient- GI clinic at [facility] 


Pathology: pending











Alcohol Abuse


Patient drinks 6-10 cans of beer a day


continue alcohol withdrawal protocol with gabapentin


continue multivitamin, thiamine, folic acid


Patient not interested in Alcohol Rehab placement








HTN:


Mildly elevated


Asymptomatic


Also received Salt Tablets


Continue lisinopril








Hypomagnesemia:


Resolved 


monitor








Right hip osteoarthritis 


H/O falls


Follow up with Orthopedics as outpatient


PT/OT








DVT Px


SQ Lovenox








Disposition:


PT/OT: return Home





Plan to discharge home today








Follow up with your [facility] Care physician [facility] in 1 week


Follow up with  as outpatient- GI clinic at [facility]  for 

colonoscopy as outpatient








Restrict your oral fluid intake to 1800ml per day as suggested by your 

Nephrologist


Get Renal function test (Blood Test) in 1 week and follow up with your 

Physician to further asses your Sodium levels


Consider getting a CT chest as outpatient to screen/rule out cancer as 

outpatient 


Your Esophageal Pathology report is pending. Follow up with [facility] Physician 

regarding results


Quit drinking alcohol as advised


Seek immediate medical attention if your symptoms reoccur or worsen


Vital Signs:











  Date Time  Temp Pulse Resp B/P (MAP) Pulse Ox O2 Delivery O2 Flow Rate FiO2


 


8/2/18 11:13 36.6 83 18 171/84 (113) 95 Room Air  


 


8/2/18 08:00      Room Air  


 


8/2/18 07:25 36.7 90 18 163/81 (108) 93 Room Air  


 


8/2/18 04:28 36.9 58 18 158/83 (108) 95 Room Air  


 


8/2/18 00:08     94 Room Air  


 


8/1/18 23:48 37.0 62 18 147/79 (101) 95 Room Air  


 


8/1/18 19:39 36.8 99 18 165/83 (110) 95 Room Air  


 


8/1/18 16:51 36.7 79 18 161/84 (109) 93 Room Air  


 


8/1/18 16:10  83 20 171/77 (108) 95 Room Air  


 


8/1/18 16:00     94 Room Air  


 


8/1/18 16:00  89 20 157/88 (111) 94 Room Air  


 


8/1/18 15:53  94 20 146/78 (100) 95 Room Air  


 


8/1/18 15:43  93 20 158/78 (104) 98 Partial Rebreather 5 


 


8/1/18 14:26 36.9 75 20 144/80 (101) 95 Room Air  








Lab Results:





Results Past 24 Hours








Test


  8/1/18


18:24 8/2/18


05:12 Range/Units


 


 


Sodium Level 127 129 136-145  mmol/L


 


Potassium Level 3.0 3.8 3.5-5.1  mmol/L


 


Chloride Level 91 93   mmol/L


 


Carbon Dioxide Level 31 29 21-32  mmol/L


 


Anion Gap 5.0 7.0 3-11  mmol/L


 


Blood Urea Nitrogen 8 6 7-18  mg/dl


 


Creatinine


  0.56


  0.37


  0.60-1.40


mg/dl


 


Est Creatinine Clear Calc


Drug Dose 100.0


  151.4


   ml/min


 


 


Estimated GFR (


American) 121.5


  144.0


   


 


 


Estimated GFR (Non-


American 104.8


  124.3


   


 


 


BUN/Creatinine Ratio 13.9 17.3 10-20  


 


Random Glucose 192 92 70-99  mg/dl


 


Calcium Level 8.6 8.4 8.5-10.1  mg/dl


 


White Blood Count  11.38 4.8-10.8  K/uL


 


Red Blood Count  3.92 4.7-6.1  M/uL


 


Hemoglobin  12.7 14.0-18.0  g/dL


 


Hematocrit  36.1 42-52  %


 


Mean Corpuscular Volume  92.1   fL


 


Mean Corpuscular Hemoglobin  32.4 25-34  pg


 


Mean Corpuscular Hemoglobin


Concent 


  35.2


  32-36  g/dl


 


 


RDW Standard Deviation  40.9 36.4-46.3  fL


 


RDW Coefficient of Variation  12.1 11.5-14.5  %


 


Platelet Count  196 130-400  K/uL


 


Mean Platelet Volume  8.4 7.4-10.4  fL


 


Magnesium Level  1.9 1.8-2.4  mg/dl